# Patient Record
Sex: FEMALE | Race: WHITE | Employment: OTHER | URBAN - METROPOLITAN AREA
[De-identification: names, ages, dates, MRNs, and addresses within clinical notes are randomized per-mention and may not be internally consistent; named-entity substitution may affect disease eponyms.]

---

## 2017-01-24 ENCOUNTER — TELEPHONE (OUTPATIENT)
Dept: FAMILY MEDICINE CLINIC | Age: 72
End: 2017-01-24

## 2017-01-24 NOTE — TELEPHONE ENCOUNTER
Pt called stating she spoke with [facility] and they say they have not yet received the referral to Dr. Yessica Ambriz.  Please send to [facility] for approval. Pt states she needs to be seen ASAP

## 2017-02-14 ENCOUNTER — PATIENT OUTREACH (OUTPATIENT)
Dept: FAMILY MEDICINE CLINIC | Age: 72
End: 2017-02-14

## 2017-02-14 NOTE — PROGRESS NOTES
[address] [facility] Follow Up for [facility] Admission from 2/5 - 2/12/17 for post op nausea and vomiting. RRAT score: 26. High    Medical History:     Past Medical History   Diagnosis Date    Diabetes ([address]     Hypercholesterolemia     Hypertension        This represents Transitions of Care b/c NN spoke with patient and/or caregiver within 2 business days of discharge. Mrs. Lawrence declined to schedule a PCP follow up appointment but states she will see her surgeon on Thursday, 2/16/2017. She agreed to call [facility] if she experiences any untoward symptoms, especially if not directly related to her recent hernia surgery. Course of current Hospitalization (referenced by [address] note dated 2/7/2017):   ----------------------------------------------------------------------  \"RE-ADMISSION NOTE     ASSESSMENT/PLAN:  Persistent postoperative nausea and vomiting, either from postoperative ileus and/or small bowel obstruction.  The patient will be admitted for intravenous hydration, bowel rest.       Note that the patient's CT scan does show UPJ obstruction, which was not evident.  Previously the patient's family were in no awareness of this.  Also note that the Pharmacy has contacted me regarding her dose of Neurontin, which apparently is too high for calculated creatinine clearance.  We will reduce the dose.     CHIEF COMPLAINT:  Admitted with persistent nausea and vomiting postoperatively.     HISTORY OF PRESENT ILLNESS:  The patient is a 65-year-old female, who on February 2, 2017 underwent a laparoscopic repair of a ventral hernia.  She tolerated the procedure well and she was discharged shortly thereafter.  Unfortunately, yesterday, she had persistent nausea and vomiting despite the use of Zofran was unrelenting.  She was treated in the emergency room, where CT scan showed no untoward abnormalities, but the patient could not be treated and managed in the ER and was therefore admitted. \"  ----------------------------------------------------------------------   Medication Reconciliation completed: yes. No new medications at discharge.    Prescription Medication total: 5. (pharmacy consult for polypharm needed?) no     Mrs. Lawrence states she is feeling much better and is glad to be at home. She is tolerating food, moving her bowels and getting around the house without difficulty. She is not taking any pain medication and feels she does not need it. Mrs. Lawrence states that her incision is clean and dry- no swelling, oozing, discoloration, or bleeding.

## 2017-02-15 ENCOUNTER — TELEPHONE (OUTPATIENT)
Dept: FAMILY MEDICINE CLINIC | Age: 72
End: 2017-02-15

## 2017-03-03 ENCOUNTER — PATIENT OUTREACH (OUTPATIENT)
Dept: FAMILY MEDICINE CLINIC | Age: 72
End: 2017-03-03

## 2017-03-03 NOTE — PROGRESS NOTES
Chart review for patient discharge from [facility] on 2/12/2017. No ED or hospitalizations noted since hospital discharge.

## 2017-03-15 ENCOUNTER — PATIENT OUTREACH (OUTPATIENT)
Dept: FAMILY MEDICINE CLINIC | Age: 72
End: 2017-03-15

## 2017-03-15 NOTE — PROGRESS NOTES
Patient has completed 30 days since [facility] discharge on 2/12/2017 for c/o nausea and vomiting post surgery. Mrs. Lawrence had declined a PCP follow up visit. She has not returned to the ED or hospital since discharge. Spoke briefly with Mr. Lolis Barnard this morning via telephone (permissions in EMR). Mr. Lolis Barnard states his wife has gone back to work- she works one day a week and today is the day. He states she is much better and life is getting back to normal.    I advised she call the office if there are any questions or concerns. This episode is closed.

## 2017-05-11 ENCOUNTER — OFFICE VISIT (OUTPATIENT)
Dept: FAMILY MEDICINE CLINIC | Age: 72
End: 2017-05-11

## 2017-05-11 VITALS
WEIGHT: 153 LBS | BODY MASS INDEX: 26.12 KG/M2 | RESPIRATION RATE: 24 BRPM | TEMPERATURE: 98.1 F | OXYGEN SATURATION: 98 % | DIASTOLIC BLOOD PRESSURE: 64 MMHG | HEIGHT: 64 IN | HEART RATE: 69 BPM | SYSTOLIC BLOOD PRESSURE: 118 MMHG

## 2017-05-11 DIAGNOSIS — J06.9 UPPER RESPIRATORY TRACT INFECTION, UNSPECIFIED TYPE: Primary | ICD-10-CM

## 2017-05-11 RX ORDER — AMOXICILLIN AND CLAVULANATE POTASSIUM 500; 125 MG/1; MG/1
1 TABLET, FILM COATED ORAL 2 TIMES DAILY
Qty: 20 TAB | Refills: 0 | Status: SHIPPED | OUTPATIENT
Start: 2017-05-11 | End: 2017-05-21

## 2017-05-11 NOTE — PROGRESS NOTES
[place] Dave is a 66 y.o. female  Chief Complaint   Patient presents with    Nasal Congestion     x 3 days     1. Have you been to the ER, urgent care clinic since your last visit? Hospitalized since your last visit? No    2. Have you seen or consulted any other health care providers outside of the [address] [place] [address] since your last visit? Include any pap smears or colon screening.  No

## 2017-05-11 NOTE — MR AVS SNAPSHOT
Visit Information Date & Time Provider Department Dept. Phone Encounter #  
 5/11/2017  2:30 PM Ashlie Meza, [address] [phone] Upcoming Health Maintenance Date Due Pneumococcal 65+ Low/Medium Risk (1 of 2 - PCV13) 6/1/2009 FOBT Q 1 YEAR, 18+ 9/24/2016 HEMOGLOBIN A1C Q6M 2/11/2017 INFLUENZA AGE 9 TO ADULT 8/1/2017 MICROALBUMIN Q1 8/11/2017 LIPID PANEL Q1 8/11/2017 EYE EXAM RETINAL OR DILATED Q1 8/13/2017 FOOT EXAM Q1 11/28/2017 MEDICARE YEARLY EXAM 11/29/2017 GLAUCOMA SCREENING Q2Y 8/13/2018 BREAST CANCER SCRN MAMMOGRAM 1/10/2019 DTaP/Tdap/Td series (2 - Td) 6/1/2026 Allergies as of 5/11/2017  Review Complete On: 11/28/2016 By: Karly Tran MD  
  
 Severity Noted Reaction Type Reactions Ace Inhibitors  12/11/2012    Cough Atenolol  08/25/2014    Cough Current Immunizations  Never Reviewed No immunizations on file. Not reviewed this visit You Were Diagnosed With   
  
 Codes Comments Upper respiratory tract infection, unspecified type    -  Primary ICD-10-CM: J06.9 ICD-9-CM: 465.9 Vitals BP Pulse Temp Resp Height(growth percentile) Weight(growth percentile)  
 118/64 (BP 1 Location: Left arm, BP Patient Position: Sitting) 69 98.1 °F (36.7 °C) (Oral) 24 5' 4\" (1.626 m) 153 lb (69.4 kg) SpO2 BMI OB Status Smoking Status 98% 26.26 kg/m2 Postmenopausal Former Smoker BMI and BSA Data Body Mass Index Body Surface Area  
 26.26 kg/m 2 1.77 m 2 Preferred Pharmacy Pharmacy Name Phone [facility] [address], [address] [place] [address] [phone] Your Updated Medication List  
  
   
This list is accurate as of: 5/11/17  3:21 PM.  Always use your most recent med list.  
  
  
  
  
 amoxicillin-clavulanate 500-125 mg per tablet Commonly known as:  AUGMENTIN  
 Take 1 Tab by mouth two (2) times a day for 10 days. aspirin delayed-release 81 mg tablet Take  by mouth daily. BIOTIN PO Take  by mouth. cholecalciferol (vitamin D3) 2,000 unit Tab Take  by mouth daily. cranberry extract 450 mg Tab Take  by mouth daily. gabapentin 300 mg capsule Commonly known as:  NEURONTIN Take 2 Caps by mouth three (3) times daily. hydroCHLOROthiazide 25 mg tablet Commonly known as:  HYDRODIURIL  
TAKE ONE TABLET BY MOUTH ONCE DAILY losartan 100 mg tablet Commonly known as:  COZAAR  
TAKE ONE TABLET BY MOUTH ONCE DAILY  
  
 naproxen 500 mg tablet Commonly known as:  NAPROSYN Take 1 tablet by mouth two (2) times daily as needed for Pain.  
  
 simvastatin 20 mg tablet Commonly known as:  ZOCOR  
TAKE ONE TABLET BY MOUTH NIGHTLY Prescriptions Sent to Pharmacy Refills  
 amoxicillin-clavulanate (AUGMENTIN) 500-125 mg per tablet 0 Sig: Take 1 Tab by mouth two (2) times a day for 10 days. Class: Normal  
 Pharmacy: [facility] [address] #: [phone] Route: Oral  
  
Patient Instructions Upper Respiratory Infection (Cold): Care Instructions Your Care Instructions An upper respiratory infection, or URI, is an infection of the nose, sinuses, or throat. URIs are spread by coughs, sneezes, and direct contact. The common cold is the most frequent kind of URI. The flu and sinus infections are other kinds of URIs. Almost all URIs are caused by viruses. Antibiotics won't cure them. But you can treat most infections with home care. This may include drinking lots of fluids and taking over-the-counter pain medicine. You will probably feel better in 4 to 10 days. The doctor has checked you carefully, but problems can develop later. If you notice any problems or new symptoms, get medical treatment right away. Follow-up care is a key part of your treatment and safety. Be sure to make and go to all appointments, and call your doctor if you are having problems. It's also a good idea to know your test results and keep a list of the medicines you take. How can you care for yourself at home? · To prevent dehydration, drink plenty of fluids, enough so that your urine is light yellow or clear like water. Choose water and other caffeine-free clear liquids until you feel better. If you have kidney, heart, or liver disease and have to limit fluids, talk with your doctor before you increase the amount of fluids you drink. · Take an over-the-counter pain medicine, such as acetaminophen (Tylenol), ibuprofen (Advil, Motrin), or naproxen (Aleve). Read and follow all instructions on the label. · Before you use cough and cold medicines, check the label. These medicines may not be safe for young children or for people with certain health problems. · Be careful when taking over-the-counter cold or flu medicines and Tylenol at the same time. Many of these medicines have acetaminophen, which is Tylenol. Read the labels to make sure that you are not taking more than the recommended dose. Too much acetaminophen (Tylenol) can be harmful. · Get plenty of rest. 
· Do not smoke or allow others to smoke around you. If you need help quitting, talk to your doctor about stop-smoking programs and medicines. These can increase your chances of quitting for good. When should you call for help? Call 911 anytime you think you may need emergency care. For example, call if: 
· You have severe trouble breathing. Call your doctor now or seek immediate medical care if: 
· You seem to be getting much sicker. · You have new or worse trouble breathing. · You have a new or higher fever. · You have a new rash. Watch closely for changes in your health, and be sure to contact your doctor if: · You have a new symptom, such as a sore throat, an earache, or sinus pain. · You cough more deeply or more often, especially if you notice more mucus or a change in the color of your mucus. · You do not get better as expected. Where can you learn more? Go to http://tierra-nguyen.info/. Enter O283 in the search box to learn more about \"Upper Respiratory Infection (Cold): Care Instructions. \" Current as of: June 30, 2016 Content Version: 11.2 © 0354-6184 [employer]. Care instructions adapted under license by [employer] (which disclaims liability or warranty for this information). If you have questions about a medical condition or this instruction, always ask your healthcare professional. [address] any warranty or liability for your use of this information. Introducing [facility]! Dear Chato Francisco: 
Thank you for requesting a [employer] account. Our records indicate that you already have an active [employer] account. You can access your account anytime at [email or web address] Did you know that you can access your hospital and ER discharge instructions at any time in [employer]? You can also review all of your test results from your hospital stay or ER visit. Additional Information If you have questions, please visit the Frequently Asked Questions section of the [employer] website at [email or web address] Remember, [employer] is NOT to be used for urgent needs. For medical emergencies, dial 911. Now available from your iPhone and Android! Please provide this summary of care documentation to your next provider. Your primary care clinician is listed as Rusty Peralta. If you have any questions after today's visit, please call [phone].

## 2017-05-11 NOTE — PATIENT INSTRUCTIONS

## 2017-05-11 NOTE — PROGRESS NOTES
Assessment/Plan:    Fela Paniagua was seen today for nasal congestion. Diagnoses and all orders for this visit:    Upper respiratory tract infection, unspecified type  -     amoxicillin-clavulanate (AUGMENTIN) 500-125 mg per tablet; Take 1 Tab by mouth two (2) times a day for 10 days. Will cont Mucinex. Will call if she is not improving. The plan was discussed with the patient. The patient verbalized understanding and is in agreement with the plan. All medication potential side effects were discussed with the patient.    -------------------------------------------------------------------------------------------------------------------        Luh Husain is a 66 y.o. female and presents with Nasal Congestion (x 3 days)         Subjective:  Pt here for 3 days of sinus pressure, congestion, a lot of nasal drainage, + cough, + tired, low in energy. Has started taking some Mucinex but just recently. ROS:  Constitutional: No recent weight change. No weakness/fatigue. No f/c. Skin: No rashes, change in nails/hair, itching   HENT: No HA, dizziness. No hearing loss/tinnitus. No nasal congestion/discharge. Eyes: No change in vision, double/blurred vision or eye pain/redness. Cardiovascular: No CP/palpitations. No ESQUEDA/orthopnea/PND. Respiratory: No cough/sputum, dyspnea, wheezing. Gastointestinal: No dysphagia, reflux. No n/v. No constipation/diarrhea. No melena/rectal bleeding. Genitourinary: No dysuria, urinary hesitancy, nocturia, hematuria. No incontinence. Musculoskeletal: No joint pain/stiffness. No muscle pain/tenderness. Endo: No heat/cold intolerance, no polyuria/polydypsia. Heme: No h/o anemia. No easy bleeding/bruising. Allergy/Immunology: No seasonal rhinitis. Denies frequent colds, sinus/ear infections. Neurological: No seizures/numbness/weakness. No paresthesias. Psychiatric:  No depression, anxiety.      The problem list was updated as a part of today's visit. Patient Active Problem List   Diagnosis Code    HTN (hypertension) I10    HLD (hyperlipidemia) E78.5    DMII (diabetes mellitus, type 2) ([facility] [address] E11.9    Vitamin D deficiency E55.9    Osteoarthritis of neck M47.812    Abnormal uterine bleeding N93.9    CKD (chronic kidney disease) stage 3, GFR 30-59 ml/min N18.3    Advance care planning Z71.89    Neural foraminal stenosis of lumbar spine B69.42    Umbilical hernia C70.9       The PSH, FH were reviewed. SH:  Social History   Substance Use Topics    Smoking status: Former Smoker    Smokeless tobacco: Never Used    Alcohol use No       Medications/Allergies:  Current Outpatient Prescriptions on File Prior to Visit   Medication Sig Dispense Refill    simvastatin (ZOCOR) 20 mg tablet TAKE ONE TABLET BY MOUTH NIGHTLY 90 Tab 0    hydroCHLOROthiazide (HYDRODIURIL) 25 mg tablet TAKE ONE TABLET BY MOUTH ONCE DAILY 90 Tab 1    losartan (COZAAR) 100 mg tablet TAKE ONE TABLET BY MOUTH ONCE DAILY 90 Tab 1    BIOTIN PO Take  by mouth.  cranberry extract (AZO CRANBERRY) 450 mg tab Take  by mouth daily.  aspirin delayed-release 81 mg tablet Take  by mouth daily.  cholecalciferol, vitamin D3, 2,000 unit Tab Take  by mouth daily.  gabapentin (NEURONTIN) 300 mg capsule Take 2 Caps by mouth three (3) times daily. 540 Cap 1    naproxen (NAPROSYN) 500 mg tablet Take 1 tablet by mouth two (2) times daily as needed for Pain. 60 tablet 5     No current facility-administered medications on file prior to visit.          Allergies   Allergen Reactions    Ace Inhibitors Cough    Atenolol Cough         Health Maintenance:   Health Maintenance   Topic Date Due    Pneumococcal 65+ Low/Medium Risk (1 of 2 - PCV13) 06/01/2009    FOBT Q 1 YEAR, 18+  09/24/2016    HEMOGLOBIN A1C Q6M  02/11/2017    INFLUENZA AGE 9 TO ADULT  08/01/2017    MICROALBUMIN Q1  08/11/2017    LIPID PANEL Q1  08/11/2017    EYE EXAM RETINAL OR DILATED Q1  08/13/2017    FOOT EXAM Q1  11/28/2017    MEDICARE YEARLY EXAM  11/29/2017    GLAUCOMA SCREENING Q2Y  08/13/2018    BREAST CANCER SCRN MAMMOGRAM  01/10/2019    DTaP/Tdap/Td series (2 - Td) 06/01/2026    OSTEOPOROSIS SCREENING (DEXA)  Completed    ZOSTER VACCINE AGE 60>  Completed       Objective:  Visit Vitals    /64 (BP 1 Location: Left arm, BP Patient Position: Sitting)    Pulse 69    Temp 98.1 °F (36.7 °C) (Oral)    Resp 24    Ht 5' 4\" (1.626 m)    Wt 153 lb (69.4 kg)    SpO2 98%    BMI 26.26 kg/m2          Nurses notes and VS reviewed. Physical Examination: General appearance - alert, well appearing, and in no distress  Mouth - erythematous  Neck - supple, no significant adenopathy  Chest - rhonchi noted mild and scattered. Heart - normal rate and regular rhythm        Labwork and Ancillary Studies:    CBC w/Diff  Lab Results   Component Value Date/Time    WBC 5.9 08/11/2016 09:58 AM    HGB 12.1 08/11/2016 09:58 AM    PLATELET 014 59/18/5229 09:58 AM         Basic Metabolic Profile  Lab Results   Component Value Date/Time    Sodium 144 08/11/2016 09:58 AM    Potassium 5.0 08/11/2016 09:58 AM    Chloride 104 08/11/2016 09:58 AM    CO2 27 08/11/2016 09:58 AM    Glucose 141 08/11/2016 09:58 AM    BUN 25 08/11/2016 09:58 AM    Creatinine 1.08 08/11/2016 09:58 AM    BUN/Creatinine ratio 23 08/11/2016 09:58 AM    GFR est AA 59 08/11/2016 09:58 AM    GFR est non-AA 51 08/11/2016 09:58 AM    Calcium 10.0 08/11/2016 09:58 AM         LFT  Lab Results   Component Value Date/Time    ALT (SGPT) 14 08/11/2016 09:58 AM    AST (SGOT) 14 08/11/2016 09:58 AM    Alk.  phosphatase 79 08/11/2016 09:58 AM    Bilirubin, total 0.5 08/11/2016 09:58 AM         Cholesterol  Lab Results   Component Value Date/Time    Cholesterol, total 148 08/11/2016 09:58 AM    HDL Cholesterol 47 08/11/2016 09:58 AM    LDL, calculated 80 08/11/2016 09:58 AM    Triglyceride 105 08/11/2016 09:58 AM

## 2017-05-22 ENCOUNTER — HOSPITAL ENCOUNTER (OUTPATIENT)
Dept: LAB | Age: 72
Discharge: HOME | End: 2017-05-22

## 2017-05-22 PROCEDURE — 99001 SPECIMEN HANDLING PT-LAB: CPT | Performed by: INTERNAL MEDICINE

## 2017-05-23 ENCOUNTER — TELEPHONE (OUTPATIENT)
Dept: FAMILY MEDICINE CLINIC | Age: 72
End: 2017-05-23

## 2017-05-23 NOTE — TELEPHONE ENCOUNTER
Pt called and stated that Dr Jolanta Resendez called her and wanted her to come in to go over lab results. Pt stated that she willl be going out of town early Friday morning but Dr Jolanta Resendez next availability wont be until Friday. She asked for the nurse to call her to see if she could get fit in between tomorrow and Thursday.

## 2017-05-25 ENCOUNTER — HOSPITAL ENCOUNTER (OUTPATIENT)
Dept: LAB | Age: 72
Discharge: HOME | End: 2017-05-25

## 2017-05-25 ENCOUNTER — TELEPHONE (OUTPATIENT)
Dept: FAMILY MEDICINE CLINIC | Age: 72
End: 2017-05-25

## 2017-05-25 ENCOUNTER — OFFICE VISIT (OUTPATIENT)
Dept: FAMILY MEDICINE CLINIC | Age: 72
End: 2017-05-25

## 2017-05-25 VITALS
BODY MASS INDEX: 26.46 KG/M2 | RESPIRATION RATE: 17 BRPM | SYSTOLIC BLOOD PRESSURE: 140 MMHG | HEART RATE: 66 BPM | HEIGHT: 64 IN | WEIGHT: 155 LBS | DIASTOLIC BLOOD PRESSURE: 64 MMHG | OXYGEN SATURATION: 98 % | TEMPERATURE: 98.1 F

## 2017-05-25 DIAGNOSIS — R53.83 FATIGUE, UNSPECIFIED TYPE: ICD-10-CM

## 2017-05-25 DIAGNOSIS — E87.5 HYPERKALEMIA: ICD-10-CM

## 2017-05-25 DIAGNOSIS — E78.00 PURE HYPERCHOLESTEROLEMIA: ICD-10-CM

## 2017-05-25 DIAGNOSIS — E11.22 TYPE 2 DIABETES MELLITUS WITH STAGE 3 CHRONIC KIDNEY DISEASE, WITHOUT LONG-TERM CURRENT USE OF INSULIN (HCC): Primary | ICD-10-CM

## 2017-05-25 DIAGNOSIS — N18.30 TYPE 2 DIABETES MELLITUS WITH STAGE 3 CHRONIC KIDNEY DISEASE, WITHOUT LONG-TERM CURRENT USE OF INSULIN (HCC): ICD-10-CM

## 2017-05-25 DIAGNOSIS — N18.30 TYPE 2 DIABETES MELLITUS WITH STAGE 3 CHRONIC KIDNEY DISEASE, WITHOUT LONG-TERM CURRENT USE OF INSULIN (HCC): Primary | ICD-10-CM

## 2017-05-25 DIAGNOSIS — E11.22 TYPE 2 DIABETES MELLITUS WITH STAGE 3 CHRONIC KIDNEY DISEASE, WITHOUT LONG-TERM CURRENT USE OF INSULIN (HCC): ICD-10-CM

## 2017-05-25 PROCEDURE — 99001 SPECIMEN HANDLING PT-LAB: CPT | Performed by: INTERNAL MEDICINE

## 2017-05-25 RX ORDER — INSULIN PUMP SYRINGE, 3 ML
EACH MISCELLANEOUS
Qty: 1 KIT | Refills: 0 | Status: SHIPPED | OUTPATIENT
Start: 2017-05-25 | End: 2017-05-25 | Stop reason: SDUPTHER

## 2017-05-25 RX ORDER — INSULIN PUMP SYRINGE, 3 ML
EACH MISCELLANEOUS
Qty: 1 KIT | Refills: 0 | Status: SHIPPED | OUTPATIENT
Start: 2017-05-25 | End: 2018-03-20 | Stop reason: SDUPTHER

## 2017-05-25 RX ORDER — SIMVASTATIN 20 MG/1
TABLET, FILM COATED ORAL
Qty: 90 TAB | Refills: 3 | Status: SHIPPED | OUTPATIENT
Start: 2017-05-25 | End: 2018-05-30 | Stop reason: SDUPTHER

## 2017-05-25 RX ORDER — LANCETS
EACH MISCELLANEOUS
Qty: 100 EACH | Refills: 11 | Status: SHIPPED | OUTPATIENT
Start: 2017-05-25 | End: 2021-02-01 | Stop reason: SDUPTHER

## 2017-05-25 NOTE — PROGRESS NOTES
1. Have you been to the ER, urgent care clinic since your last visit? Hospitalized since your last visit? Yes, Feb 2017 hernia surgery 2/2, readmitted 2/4-2/12/17      2. Have you seen or consulted any other health care providers outside of the [address] [place] [address] since your last visit? Include any pap smears or colon screening.    Yes, Dr. Chela Min hernia surgery and readmission to [facility]

## 2017-05-25 NOTE — TELEPHONE ENCOUNTER
[facility] called stating they received a prescription request for this pt for diabetic testing supplies however they cannot process the prescriptions. They state the prescription needs to have a brand name as well as diagnosis information. Please re submit new prescription when possible.

## 2017-05-25 NOTE — PROGRESS NOTES
Assessment/Plan:    1. Type 2 diabetes mellitus with stage 3 chronic kidney disease, without long-term current use of insulin ([facility])  -A1c 7.2.  monitor bs. F/u in 3-6 mos. - METABOLIC PANEL, BASIC; Future  - MICROALBUMIN, UR, RAND W/ MICROALBUMIN/CREA RATIO; Future  - REFERRAL TO OPHTHALMOLOGY  - Blood-Glucose Meter monitoring kit; Test bs daily. E11.22  Dispense: 1 Kit; Refill: 0  - Lancets misc; Test bs daily. E11.22  Dispense: 100 Each; Refill: 11  - glucose blood VI test strips (BLOOD GLUCOSE TEST) strip; E11.22. Test bs daily  Dispense: 100 Strip; Refill: 11    2. Fatigue, unspecified type  - OCCULT BLOOD, STOOL; Future    3. Hyperkalemia  -dayne. - METABOLIC PANEL, BASIC; Future    4. Pure hypercholesterolemia  -refilled  - simvastatin (ZOCOR) 20 mg tablet; TAKE ONE TABLET BY MOUTH NIGHTLY  Dispense: 90 Tab; Refill: 3    The plan was discussed with the patient. The patient verbalized understanding and is in agreement with the plan. All medication potential side effects were discussed with the patient. Health Maintenance:   Health Maintenance   Topic Date Due    Pneumococcal 65+ Low/Medium Risk (1 of 2 - PCV13) 06/01/2009    FOBT Q 1 YEAR, 18+  09/24/2016    INFLUENZA AGE 9 TO ADULT  08/01/2017    MICROALBUMIN Q1  08/11/2017    EYE EXAM RETINAL OR DILATED Q1  08/13/2017    HEMOGLOBIN A1C Q6M  11/22/2017    FOOT EXAM Q1  11/28/2017    MEDICARE YEARLY EXAM  11/29/2017    LIPID PANEL Q1  05/22/2018    GLAUCOMA SCREENING Q2Y  08/13/2018    BREAST CANCER SCRN MAMMOGRAM  01/10/2019    DTaP/Tdap/Td series (2 - Td) 06/01/2026    OSTEOPOROSIS SCREENING (DEXA)  Completed    ZOSTER VACCINE AGE 60>  Completed       Vernida Kita is a 66 y.o. female and presents with Diabetes and Follow Up Chronic Condition     Subjective:  DM2 - slightly higher A1c. Has lost wt. HLD - on statin. LDL good. ROS:  Constitutional: No recent weight change. No weakness/fatigue. No f/c.    Cardiovascular: No CP/palpitations. No ESQUEDA/orthopnea/PND. Respiratory: No cough/sputum, dyspnea, wheezing. Gastointestinal: No dysphagia, reflux. No n/v. No constipation/diarrhea. No melena/rectal bleeding. Genitourinary: No dysuria, urinary hesitancy, nocturia, hematuria. No incontinence. Neurological: No seizures/numbness/weakness. No paresthesias. The problem list was updated as a part of today's visit. Patient Active Problem List   Diagnosis Code    HTN (hypertension) I10    HLD (hyperlipidemia) E78.5    DMII (diabetes mellitus, type 2) ([facility] [address] E11.9    Vitamin D deficiency E55.9    Osteoarthritis of neck M47.812    Abnormal uterine bleeding N93.9    CKD (chronic kidney disease) stage 3, GFR 30-59 ml/min N18.3    Advance care planning Z71.89    Neural foraminal stenosis of lumbar spine W40.50    Umbilical hernia F76.7       The PSH, FH were reviewed. SH:  Social History   Substance Use Topics    Smoking status: Former Smoker    Smokeless tobacco: Never Used    Alcohol use No       Medications/Allergies:  Current Outpatient Prescriptions on File Prior to Visit   Medication Sig Dispense Refill    simvastatin (ZOCOR) 20 mg tablet TAKE ONE TABLET BY MOUTH NIGHTLY 90 Tab 0    hydroCHLOROthiazide (HYDRODIURIL) 25 mg tablet TAKE ONE TABLET BY MOUTH ONCE DAILY 90 Tab 1    losartan (COZAAR) 100 mg tablet TAKE ONE TABLET BY MOUTH ONCE DAILY 90 Tab 1    BIOTIN PO Take  by mouth.  naproxen (NAPROSYN) 500 mg tablet Take 1 tablet by mouth two (2) times daily as needed for Pain. 60 tablet 5    aspirin delayed-release 81 mg tablet Take  by mouth daily.  cholecalciferol, vitamin D3, 2,000 unit Tab Take  by mouth daily. No current facility-administered medications on file prior to visit.          Allergies   Allergen Reactions    Fentanyl Shortness of Breath    Ace Inhibitors Cough    Atenolol Cough    Lisinopril Cough     Objective:  Visit Vitals    /64 (BP 1 Location: Right arm, BP Patient Position: Sitting)    Pulse 66    Temp 98.1 °F (36.7 °C) (Oral)    Resp 17    Ht 5' 4\" (1.626 m)    Wt 155 lb (70.3 kg)    SpO2 98%    BMI 26.61 kg/m2      Constitutional: Well developed, nourished, no distress, alert,   CV: S1, S2.  RRR. No murmurs/rubs. No thrills palpated. No carotid bruits. Intact distal pulses. No edema. Pulm: No abnormalities on inspection. Clear to auscultation bilaterally. No wheezing/rhonchi. Normal effort. GI: Soft, nontender, nondistended. Normal active bowel sounds. Neuro: A/O x 3. No focal motor or sensory deficits. Speech normal.   Psych: Appropriate affect, judgement and insight. Short-term memory intact. Labwork and Ancillary Studies:    CBC w/Diff  Lab Results   Component Value Date/Time    WBC 5.9 08/11/2016 09:58 AM    HGB 12.1 08/11/2016 09:58 AM    PLATELET 095 01/30/7329 09:58 AM         Basic Metabolic Profile/LFTs  Lab Results   Component Value Date/Time    Sodium 139 05/22/2017 08:47 AM    Potassium 5.4 05/22/2017 08:47 AM    Chloride 98 05/22/2017 08:47 AM    CO2 25 05/22/2017 08:47 AM    Glucose 140 05/22/2017 08:47 AM    BUN 28 05/22/2017 08:47 AM    Creatinine 1.24 05/22/2017 08:47 AM    BUN/Creatinine ratio 23 05/22/2017 08:47 AM    GFR est AA 50 05/22/2017 08:47 AM    GFR est non-AA 43 05/22/2017 08:47 AM    Calcium 9.7 05/22/2017 08:47 AM      Lab Results   Component Value Date/Time    ALT (SGPT) 16 05/22/2017 08:47 AM    AST (SGOT) 14 05/22/2017 08:47 AM    Alk.  phosphatase 81 05/22/2017 08:47 AM    Bilirubin, total 0.5 05/22/2017 08:47 AM       Cholesterol  Lab Results   Component Value Date/Time    Cholesterol, total 161 05/22/2017 08:47 AM    HDL Cholesterol 44 05/22/2017 08:47 AM    LDL,Direct 94 05/22/2017 08:47 AM    LDL, calculated 84 05/22/2017 08:47 AM    Triglyceride 163 05/22/2017 08:47 AM

## 2017-05-25 NOTE — MR AVS SNAPSHOT
Visit Information Date & Time Provider Department Dept. Phone Encounter #  
 5/25/2017 10:00 AM Homarla Aundrea, 3 [facility] [phone] [id number] Upcoming Health Maintenance Date Due Pneumococcal 65+ Low/Medium Risk (1 of 2 - PCV13) 6/1/2009 FOBT Q 1 YEAR, 18+ 9/24/2016 INFLUENZA AGE 9 TO ADULT 8/1/2017 MICROALBUMIN Q1 8/11/2017 EYE EXAM RETINAL OR DILATED Q1 8/13/2017 HEMOGLOBIN A1C Q6M 11/22/2017 FOOT EXAM Q1 11/28/2017 MEDICARE YEARLY EXAM 11/29/2017 LIPID PANEL Q1 5/22/2018 GLAUCOMA SCREENING Q2Y 8/13/2018 BREAST CANCER SCRN MAMMOGRAM 1/10/2019 DTaP/Tdap/Td series (2 - Td) 6/1/2026 Allergies as of 5/25/2017  Review Complete On: 5/25/2017 By: Annmarie Guillaume MD  
  
 Severity Noted Reaction Type Reactions Fentanyl High 04/14/2016    Shortness of Breath Ace Inhibitors  12/11/2012    Cough Atenolol  08/25/2014    Cough Lisinopril  03/08/2013    Cough Current Immunizations  Never Reviewed No immunizations on file. Not reviewed this visit You Were Diagnosed With   
  
 Codes Comments Type 2 diabetes mellitus with stage 3 chronic kidney disease, without long-term current use of insulin (HCC)    -  Primary ICD-10-CM: E11.22, N18.3 ICD-9-CM: 250.40, 585.3 Fatigue, unspecified type     ICD-10-CM: R53.83 ICD-9-CM: 780.79 Hyperkalemia     ICD-10-CM: E87.5 ICD-9-CM: 276.7 Pure hypercholesterolemia     ICD-10-CM: E78.00 ICD-9-CM: 272.0 Vitals BP Pulse Temp Resp Height(growth percentile) Weight(growth percentile) 140/64 (BP 1 Location: Right arm, BP Patient Position: Sitting) 66 98.1 °F (36.7 °C) (Oral) 17 5' 4\" (1.626 m) 155 lb (70.3 kg) SpO2 BMI OB Status Smoking Status 98% 26.61 kg/m2 Postmenopausal Former Smoker Vitals History BMI and BSA Data Body Mass Index Body Surface Area  
 26.61 kg/m 2 1.78 m 2 Preferred Pharmacy Pharmacy Name Phone [facility] Ehsan Stringer [address], [address] [place] [address] [facility] [phone] Your Updated Medication List  
  
   
This list is accurate as of: 5/25/17 10:25 AM.  Always use your most recent med list.  
  
  
  
  
 aspirin delayed-release 81 mg tablet Take  by mouth daily. BIOTIN PO Take  by mouth. Blood-Glucose Meter monitoring kit Test bs daily. E11.22  
  
 cholecalciferol (vitamin D3) 2,000 unit Tab Take  by mouth daily. glucose blood VI test strips strip Commonly known as:  blood glucose test  
E11.22. Test bs daily  
  
 hydroCHLOROthiazide 25 mg tablet Commonly known as:  HYDRODIURIL  
TAKE ONE TABLET BY MOUTH ONCE DAILY Lancets Misc Test bs daily. E11.22  
  
 losartan 100 mg tablet Commonly known as:  COZAAR  
TAKE ONE TABLET BY MOUTH ONCE DAILY  
  
 naproxen 500 mg tablet Commonly known as:  NAPROSYN Take 1 tablet by mouth two (2) times daily as needed for Pain.  
  
 simvastatin 20 mg tablet Commonly known as:  ZOCOR  
TAKE ONE TABLET BY MOUTH NIGHTLY Prescriptions Sent to Pharmacy Refills  
 simvastatin (ZOCOR) 20 mg tablet 3 Sig: TAKE ONE TABLET BY MOUTH NIGHTLY Class: Normal  
 Pharmacy: [facility] [address] Ph #: [phone] Blood-Glucose Meter monitoring kit 0 Sig: Test bs daily. E11.22 Class: Normal  
 Pharmacy: [facility] [address] Ph #: [phone] Lancets misc 11 Sig: Test bs daily. E11.22 Class: Normal  
 Pharmacy: [facility] [address] Ph #: [phone]  
 glucose blood VI test strips (BLOOD GLUCOSE TEST) strip 11 Sig: E11.22. Test bs daily Class: Normal  
 Pharmacy: [facility] [address] Ph #: [phone] We Performed the Following REFERRAL TO OPHTHALMOLOGY [REF57 Custom] Comments:  
 Please evaluate patient for diabetic eye. [facility] to schedule at [facility] To-Do List   
 05/25/2017 Lab:  METABOLIC PANEL, BASIC   
  
 05/25/2017 Lab:  MICROALBUMIN, UR, RAND W/ MICROALBUMIN/CREA RATIO   
  
 05/25/2017 Lab:  OCCULT BLOOD, STOOL Referral Information Referral ID Referred By Referred To  
  
 [id number] Precious Carey V Not Available Visits Status Start Date End Date 1 New Request 5/25/17 5/25/18 If your referral has a status of pending review or denied, additional information will be sent to support the outcome of this decision. Introducing [facility] & HEALTH SERVICES! Dear Angie Stage: 
Thank you for requesting a [employer] account. Our records indicate that you already have an active [employer] account. You can access your account anytime at [email or web address] Did you know that you can access your hospital and ER discharge instructions at any time in [employer]? You can also review all of your test results from your hospital stay or ER visit. Additional Information If you have questions, please visit the Frequently Asked Questions section of the [employer] website at [email or web address] Remember, [employer] is NOT to be used for urgent needs. For medical emergencies, dial 911. Now available from your iPhone and Android! Please provide this summary of care documentation to your next provider. Your primary care clinician is listed as Rusty Peralta. If you have any questions after today's visit, please call [phone].

## 2017-05-26 LAB
ALBUMIN/CREAT UR: 8.6 MG/G CREAT (ref 0–30)
BUN SERPL-MCNC: 21 MG/DL (ref 8–27)
BUN/CREAT SERPL: 22 (ref 12–28)
CALCIUM SERPL-MCNC: 9.9 MG/DL (ref 8.7–10.3)
CHLORIDE SERPL-SCNC: 100 MMOL/L (ref 96–106)
CO2 SERPL-SCNC: 25 MMOL/L (ref 18–29)
CREAT SERPL-MCNC: 0.96 MG/DL (ref 0.57–1)
CREAT UR-MCNC: 116.3 MG/DL
GLUCOSE SERPL-MCNC: 149 MG/DL (ref 65–99)
INTERPRETATION: NORMAL
Lab: NORMAL
MICROALBUMIN UR-MCNC: 10 UG/ML
POTASSIUM SERPL-SCNC: 4.8 MMOL/L (ref 3.5–5.2)
SODIUM SERPL-SCNC: 142 MMOL/L (ref 134–144)

## 2017-06-15 ENCOUNTER — OFFICE VISIT (OUTPATIENT)
Dept: FAMILY MEDICINE CLINIC | Age: 72
End: 2017-06-15

## 2017-06-15 VITALS
BODY MASS INDEX: 26.46 KG/M2 | TEMPERATURE: 97.3 F | RESPIRATION RATE: 22 BRPM | WEIGHT: 155 LBS | DIASTOLIC BLOOD PRESSURE: 68 MMHG | HEART RATE: 77 BPM | OXYGEN SATURATION: 97 % | HEIGHT: 64 IN | SYSTOLIC BLOOD PRESSURE: 112 MMHG

## 2017-06-15 DIAGNOSIS — H57.11 EYE PAIN, RIGHT: Primary | ICD-10-CM

## 2017-06-15 DIAGNOSIS — N18.30 TYPE 2 DIABETES MELLITUS WITH STAGE 3 CHRONIC KIDNEY DISEASE, WITHOUT LONG-TERM CURRENT USE OF INSULIN (HCC): ICD-10-CM

## 2017-06-15 DIAGNOSIS — H53.149 PHOTOPHOBIA: ICD-10-CM

## 2017-06-15 DIAGNOSIS — E11.22 TYPE 2 DIABETES MELLITUS WITH STAGE 3 CHRONIC KIDNEY DISEASE, WITHOUT LONG-TERM CURRENT USE OF INSULIN (HCC): ICD-10-CM

## 2017-06-15 NOTE — PROGRESS NOTES
Mago Moncada is a 67 y.o. female  1. Have you been to the ER, urgent care clinic since your last visit? Hospitalized since your last visit? No    2. Have you seen or consulted any other health care providers outside of the [address] [place] [address] since your last visit? Include any pap smears or colon screening.  No

## 2017-06-15 NOTE — PROGRESS NOTES
Chief Complaint   Patient presents with    Eye Problem     right eye x1 day     Assessment/Plan  1. Eye pain, right and photophobia in setting of contact lens use- ddx includes corneal abrasion vs foreign body vs other  - REFERRAL TO OPHTHALMOLOGY      The plan was discussed with the patient. The patient verbalized understanding and is in agreement with the plan. All medication potential side effects were discussed with the patient. SUBJECTIVE:   Theoplis Brittle is a 67 y.o. female who complains of 1 day h/o R eye pain. She wears contacts. Feels like a foreign body sensation. +photophobia. Review of Systems - ENT ROS: negative  Respiratory ROS: no cough, shortness of breath, or wheezing  Cardiovascular ROS: no chest pain or dyspnea on exertion  Gastrointestinal ROS: no abdominal pain, change in bowel habits, or black or bloody stools  Musculoskeletal ROS: negative  Neurological ROS: negative    Physical Examination:   Visit Vitals    /68 (BP 1 Location: Left arm, BP Patient Position: Sitting)    Pulse 77    Temp 97.3 °F (36.3 °C) (Oral)    Resp 22    Ht 5' 4\" (1.626 m)    Wt 155 lb (70.3 kg)    SpO2 97%    BMI 26.61 kg/m2       Constitutional: Well developed, nourished, no distress, alert   HENT: Exterior ears and tympanic membranes normal bilaterally. Supple neck. No thyromegaly or lymphadenopathy. Oropharynx clear and moist mucous membranes. Eyes: Conjunctiva normal. PERRL. Mild erythema around iris. Difficulty keeping eye open   CV: S1, S2.  RRR. No murmurs/rubs. No thrills palpated. No carotid bruits. Intact distal pulses. No edema. Pulm: No abnormalities on inspection. Clear to auscultation bilaterally. No wheezing/rhonchi. Normal effort.              Erlinda Mendoza MD

## 2017-06-15 NOTE — MR AVS SNAPSHOT
Visit Information Date & Time Provider Department Dept. Phone Encounter #  
 6/15/2017  2:45 PM Ena Negron, 3 [facility] [phone] [id number] Upcoming Health Maintenance Date Due Pneumococcal 65+ Low/Medium Risk (1 of 2 - PCV13) 6/1/2009 FOBT Q 1 YEAR, 18+ 9/24/2016 INFLUENZA AGE 9 TO ADULT 8/1/2017 EYE EXAM RETINAL OR DILATED Q1 8/13/2017 HEMOGLOBIN A1C Q6M 11/22/2017 FOOT EXAM Q1 11/28/2017 MEDICARE YEARLY EXAM 11/29/2017 LIPID PANEL Q1 5/22/2018 MICROALBUMIN Q1 5/25/2018 GLAUCOMA SCREENING Q2Y 8/13/2018 BREAST CANCER SCRN MAMMOGRAM 1/10/2019 DTaP/Tdap/Td series (2 - Td) 6/1/2026 Allergies as of 6/15/2017  Review Complete On: 5/25/2017 By: Ena Negron MD  
  
 Severity Noted Reaction Type Reactions Fentanyl High 04/14/2016    Shortness of Breath Ace Inhibitors  12/11/2012    Cough Atenolol  08/25/2014    Cough Lisinopril  03/08/2013    Cough Current Immunizations  Never Reviewed No immunizations on file. Not reviewed this visit You Were Diagnosed With   
  
 Codes Comments Eye pain, right    -  Primary ICD-10-CM: H57.11 ICD-9-CM: 379.91 Photophobia     ICD-10-CM: H53.149 ICD-9-CM: 368.13 Vitals BP Pulse Temp Resp Height(growth percentile) Weight(growth percentile) 112/68 (BP 1 Location: Left arm, BP Patient Position: Sitting) 77 97.3 °F (36.3 °C) (Oral) 22 5' 4\" (1.626 m) 155 lb (70.3 kg) SpO2 BMI OB Status Smoking Status 97% 26.61 kg/m2 Postmenopausal Former Smoker Vitals History BMI and BSA Data Body Mass Index Body Surface Area  
 26.61 kg/m 2 1.78 m 2 Preferred Pharmacy Pharmacy Name Phone [facility] [address] [place] [address] [phone] Your Updated Medication List  
  
   
This list is accurate as of: 6/15/17  2:57 PM.  Always use your most recent med list.  
  
  
  
  
 aspirin delayed-release 81 mg tablet Take  by mouth daily. BIOTIN PO Take  by mouth. Blood-Glucose Meter monitoring kit Free Style,Test bs daily. E11.22  
  
 cholecalciferol (vitamin D3) 2,000 unit Tab Take  by mouth daily. * glucose blood VI test strips strip Commonly known as:  blood glucose test  
E11.22. Test bs daily * glucose blood VI test strips strip Commonly known as:  FREESTYLE TEST Dx: E11.22 test bs daily  
  
 hydroCHLOROthiazide 25 mg tablet Commonly known as:  HYDRODIURIL  
TAKE ONE TABLET BY MOUTH ONCE DAILY Lancets Misc Test bs daily. E11.22  
  
 losartan 100 mg tablet Commonly known as:  COZAAR  
TAKE ONE TABLET BY MOUTH ONCE DAILY  
  
 naproxen 500 mg tablet Commonly known as:  NAPROSYN Take 1 tablet by mouth two (2) times daily as needed for Pain.  
  
 simvastatin 20 mg tablet Commonly known as:  ZOCOR  
TAKE ONE TABLET BY MOUTH NIGHTLY * Notice: This list has 2 medication(s) that are the same as other medications prescribed for you. Read the directions carefully, and ask your doctor or other care provider to review them with you. We Performed the Following REFERRAL TO OPHTHALMOLOGY [REF57 Custom] Referral Information Referral ID Referred By Referred To  
  
 [id number] [facility], Kae Livingston MD   
   [address] [place] , [address] [facility] [address] [facility] [address] Phone: [phone] Fax: [phone] Visits Status Start Date End Date 1 New Request 6/15/17 6/15/18 If your referral has a status of pending review or denied, additional information will be sent to support the outcome of this decision. Introducing [facility] & HEALTH SERVICES! Dear Margo Connell: 
Thank you for requesting a [employer] account. Our records indicate that you already have an active [employer] account. You can access your account anytime at [email or web address] Did you know that you can access your hospital and ER discharge instructions at any time in [employer]? You can also review all of your test results from your hospital stay or ER visit. Additional Information If you have questions, please visit the Frequently Asked Questions section of the [employer] website at [email or web address] Remember, [employer] is NOT to be used for urgent needs. For medical emergencies, dial 911. Now available from your iPhone and Android! Please provide this summary of care documentation to your next provider. Your primary care clinician is listed as Rusty Peralta. If you have any questions after today's visit, please call [phone].

## 2018-03-20 ENCOUNTER — OFFICE VISIT (OUTPATIENT)
Dept: FAMILY MEDICINE CLINIC | Age: 73
End: 2018-03-20

## 2018-03-20 VITALS
HEART RATE: 71 BPM | OXYGEN SATURATION: 99 % | TEMPERATURE: 98.1 F | BODY MASS INDEX: 27.14 KG/M2 | WEIGHT: 159 LBS | DIASTOLIC BLOOD PRESSURE: 84 MMHG | SYSTOLIC BLOOD PRESSURE: 132 MMHG | HEIGHT: 64 IN | RESPIRATION RATE: 18 BRPM

## 2018-03-20 DIAGNOSIS — Z66 DNR (DO NOT RESUSCITATE): ICD-10-CM

## 2018-03-20 DIAGNOSIS — Z12.11 SCREEN FOR COLON CANCER: ICD-10-CM

## 2018-03-20 DIAGNOSIS — E11.22 TYPE 2 DIABETES MELLITUS WITH STAGE 3 CHRONIC KIDNEY DISEASE, WITHOUT LONG-TERM CURRENT USE OF INSULIN (HCC): Primary | ICD-10-CM

## 2018-03-20 DIAGNOSIS — I10 ESSENTIAL HYPERTENSION: ICD-10-CM

## 2018-03-20 DIAGNOSIS — E78.00 PURE HYPERCHOLESTEROLEMIA: ICD-10-CM

## 2018-03-20 DIAGNOSIS — Z00.00 MEDICARE ANNUAL WELLNESS VISIT, SUBSEQUENT: ICD-10-CM

## 2018-03-20 DIAGNOSIS — K42.9 UMBILICAL HERNIA WITHOUT OBSTRUCTION AND WITHOUT GANGRENE: ICD-10-CM

## 2018-03-20 DIAGNOSIS — N18.30 TYPE 2 DIABETES MELLITUS WITH STAGE 3 CHRONIC KIDNEY DISEASE, WITHOUT LONG-TERM CURRENT USE OF INSULIN (HCC): Primary | ICD-10-CM

## 2018-03-20 PROBLEM — Z28.21 23-POLYVALENT PNEUMOCOCCAL POLYSACCHARIDE VACCINE DECLINED: Status: ACTIVE | Noted: 2018-03-20

## 2018-03-20 RX ORDER — INSULIN PUMP SYRINGE, 3 ML
EACH MISCELLANEOUS
Qty: 1 KIT | Refills: 0 | Status: SHIPPED | OUTPATIENT
Start: 2018-03-20 | End: 2019-12-17 | Stop reason: SDUPTHER

## 2018-03-20 NOTE — ACP (ADVANCE CARE PLANNING)
Advance Care Planning (ACP) Provider Conversation Snapshot    Date of ACP Conversation: 03/20/18  Persons included in Conversation:  patient  Length of ACP Conversation in minutes:  <16 minutes (Non-Billable)    Authorized Decision Maker (if patient is incapable of making informed decisions):    This person is:   Healthcare Agent/Medical Power of  under Advance Directive  Bill,         For Patients with Decision Making Capacity:   pt is DNR, doesn't wish for prolonged measures past 5 days in event of coma    Conversation Outcomes / Follow-Up Plan:   Recommended completion of Advance Directive form after review of ACP materials and conversation with prospective healthcare agent   Entered DNR order (If yes, complete Durable DNR form)

## 2018-03-20 NOTE — PROGRESS NOTES
This is the Subsequent Medicare Annual Wellness Exam, performed 12 months or more after the Initial AWV or the last Subsequent AWV    I have reviewed the patient's medical history in detail and updated the computerized patient record. History     Past Medical History:   Diagnosis Date    Diabetes ([facility]     Hypercholesterolemia     Hypertension       Past Surgical History:   Procedure Laterality Date    HX COLONOSCOPY  2005    HX GI  02/02/2017    hernia surgery     Current Outpatient Prescriptions   Medication Sig Dispense Refill    hydroCHLOROthiazide (HYDRODIURIL) 25 mg tablet TAKE ONE TABLET BY MOUTH ONCE DAILY 90 Tab 0    losartan (COZAAR) 100 mg tablet TAKE ONE TABLET BY MOUTH ONCE DAILY 90 Tab 0    simvastatin (ZOCOR) 20 mg tablet TAKE ONE TABLET BY MOUTH NIGHTLY 90 Tab 3    Lancets misc Test bs daily. E11.22 100 Each 11    glucose blood VI test strips (BLOOD GLUCOSE TEST) strip E11.22. Test bs daily 100 Strip 11    glucose blood VI test strips (FREESTYLE TEST) strip Dx: E11.22 test bs daily 100 Strip 0    Blood-Glucose Meter monitoring kit Free Style,Test bs daily. E11.22 1 Kit 0    BIOTIN PO Take  by mouth.  naproxen (NAPROSYN) 500 mg tablet Take 1 tablet by mouth two (2) times daily as needed for Pain. 60 tablet 5    aspirin delayed-release 81 mg tablet Take  by mouth daily.  cholecalciferol, vitamin D3, 2,000 unit Tab Take  by mouth daily.        Allergies   Allergen Reactions    Fentanyl Shortness of Breath    Ace Inhibitors Cough    Atenolol Cough    Lisinopril Cough     Family History   Problem Relation Age of Onset    Heart Disease Mother      pacemaker    Stroke Father      Social History   Substance Use Topics    Smoking status: Former Smoker    Smokeless tobacco: Never Used    Alcohol use No     Patient Active Problem List   Diagnosis Code    HTN (hypertension) I10    HLD (hyperlipidemia) E78.5    DMII [facility]diabetes mellitus, type 2) ([facility] E11.9    Vitamin D deficiency E55.9    Osteoarthritis of neck M47.812    Abnormal uterine bleeding N93.9    CKD (chronic kidney disease) stage 3, GFR 30-59 ml/min N18.3    Advance care planning Z71.89    Neural foraminal stenosis of lumbar spine J28.55    Umbilical hernia A17.9       Depression Risk Factor Screening:     PHQ over the last two weeks 3/20/2018   Little interest or pleasure in doing things Not at all   Feeling down, depressed or hopeless Not at all   Total Score PHQ 2 0     Alcohol Risk Factor Screening: You do not drink alcohol or very rarely. Functional Ability and Level of Safety:   Hearing Loss  Hearing is good. Activities of Daily Living  The home contains: no safety equipment. Patient does total self care    Fall Risk  Fall Risk Assessment, last 12 mths 3/20/2018   Able to walk? Yes   Fall in past 12 months? Yes   Fall with injury? No   Number of falls in past 12 months 1   Fall Risk Score 1       Abuse Screen  Patient is not abused    Cognitive Screening   Evaluation of Cognitive Function:  Has your family/caregiver stated any concerns about your memory: no  Normal, Mini Cog test    Patient Care Team   Patient Care Team:  Rolando Grossman MD as PCP - General (Internal Medicine)    Assessment/Plan   Education and counseling provided:  Are appropriate based on today's review and evaluation  End-of-Life planning (with patient's consent)  Colorectal cancer screening tests    Diagnoses and all orders for this visit:    1. Medicare annual wellness visit, subsequent      Health Maintenance Due   Topic Date Due    FOBT Q 1 YEAR, 18+  09/24/2016    HEMOGLOBIN A1C Q6M  11/22/2017       Assessment/Plan:    1. Type 2 diabetes mellitus with stage 3 chronic kidney disease, without long-term current use of insulin ([facility]  -cont lifestyle. F/u in 6mos  - HEMOGLOBIN A1C W/O EAG;  Future  - MICROALBUMIN, UR, RAND W/ MICROALB/CREAT RATIO; Future  - HM DIABETES FOOT EXAM  - glucose blood VI test strips (BLOOD GLUCOSE TEST) strip; E11.22. Test bs daily  Dispense: 100 Strip; Refill: 11  - glucose blood VI test strips (FREESTYLE TEST) strip; Dx: E11.22 test bs daily  Dispense: 100 Strip; Refill: 0  - Blood-Glucose Meter monitoring kit; Free Style,Test bs daily. E11.22  Dispense: 1 Kit; Refill: 0    2. Essential hypertension  -cont current. Better controlled at last visit  - METABOLIC PANEL, COMPREHENSIVE; Future  - LIPID PANEL; Future  - CBC W/O DIFF; Future    3. Pure hypercholesterolemia  - METABOLIC PANEL, COMPREHENSIVE; Future  - LIPID PANEL; Future    4. Medicare annual wellness visit, subsequent  - METABOLIC PANEL, COMPREHENSIVE; Future  - LIPID PANEL; Future  - CBC W/O DIFF; Future    5. Screen for colon cancer  - COLOGUARD TEST (FECAL DNA COLORECTAL CANCER SCREENING)    6. DNR (do not resuscitate)  - DO NOT RESUSCITATE    7. Umbilical hernia without obstruction and without gangrene  - REFERRAL TO SURGERY      The plan was discussed with the patient. The patient verbalized understanding and is in agreement with the plan. All medication potential side effects were discussed with the patient. Health Maintenance:   Health Maintenance   Topic Date Due    FOBT Q 1 YEAR, 18+  09/24/2016    HEMOGLOBIN A1C Q6M  11/22/2017    LIPID PANEL Q1  05/22/2018    MICROALBUMIN Q1  05/25/2018    EYE EXAM RETINAL OR DILATED Q1  10/06/2018    BREAST CANCER SCRN MAMMOGRAM  01/10/2019    Pneumococcal 65+ Low/Medium Risk (2 of 2 - PPSV23) 03/20/2019    FOOT EXAM Q1  03/20/2019    MEDICARE YEARLY EXAM  03/21/2019    GLAUCOMA SCREENING Q2Y  10/06/2019    DTaP/Tdap/Td series (2 - Td) 06/01/2026    Bone Densitometry (Dexa) Screening  Completed    ZOSTER VACCINE AGE 60>  Completed    Influenza Age 5 to Adult  Addressed       Ava Yepez is a 67 y.o. female and presents with Annual Wellness Visit     Subjective:  HTN - bp elevated. Compliant with meds. Doesn't check bp at home. DM2 -  Hasn't had a1c checked since 5/2017. Not on meds. Doesn't check bs. ROS:  Constitutional: No recent weight change. No weakness/fatigue. No f/c. Skin: No rashes, change in nails/hair, itching   HENT: No HA, dizziness. No hearing loss/tinnitus. No nasal congestion/discharge. Eyes: No change in vision, double/blurred vision or eye pain/redness. Cardiovascular: No CP/palpitations. No ESQUEDA/orthopnea/PND. Respiratory: No cough/sputum, dyspnea, wheezing. Gastointestinal: No dysphagia, reflux. No n/v. No constipation/diarrhea. No melena/rectal bleeding. Genitourinary: No dysuria, urinary hesitancy, nocturia, hematuria. No incontinence. Musculoskeletal: No joint pain/stiffness. No muscle pain/tenderness. Endo: No heat/cold intolerance, no polyuria/polydypsia. Heme: No h/o anemia. No easy bleeding/bruising. Allergy/Immunology: No seasonal rhinitis. Denies frequent colds, sinus/ear infections. Neurological: No seizures/numbness/weakness. No paresthesias. Psychiatric:  No depression, anxiety. The problem list was updated as a part of today's visit. Patient Active Problem List   Diagnosis Code    HTN (hypertension) I10    HLD (hyperlipidemia) E78.5    DMII (diabetes mellitus, type 2) ([facility] E11.9    Vitamin D deficiency E55.9    Osteoarthritis of neck M47.812    Abnormal uterine bleeding N93.9    CKD (chronic kidney disease) stage 3, GFR 30-59 ml/min N18.3    Neural foraminal stenosis of lumbar spine I82.98    Umbilical hernia T19.0       The PSH, FH were reviewed. SH:  Social History   Substance Use Topics    Smoking status: Former Smoker    Smokeless tobacco: Never Used    Alcohol use No       Medications/Allergies:  Current Outpatient Prescriptions on File Prior to Visit   Medication Sig Dispense Refill    hydroCHLOROthiazide (HYDRODIURIL) 25 mg tablet TAKE ONE TABLET BY MOUTH ONCE DAILY 90 Tab 0    simvastatin (ZOCOR) 20 mg tablet TAKE ONE TABLET BY MOUTH NIGHTLY 90 Tab 3    Lancets misc Test bs daily. E11.22 100 Each 11    glucose blood VI test strips (BLOOD GLUCOSE TEST) strip E11.22. Test bs daily 100 Strip 11    glucose blood VI test strips (FREESTYLE TEST) strip Dx: E11.22 test bs daily 100 Strip 0    Blood-Glucose Meter monitoring kit Free Style,Test bs daily. E11.22 1 Kit 0    BIOTIN PO Take  by mouth.  aspirin delayed-release 81 mg tablet Take  by mouth daily.  cholecalciferol, vitamin D3, 2,000 unit Tab Take  by mouth daily.  losartan (COZAAR) 100 mg tablet TAKE ONE TABLET BY MOUTH ONCE DAILY 90 Tab 0     No current facility-administered medications on file prior to visit. Allergies   Allergen Reactions    Fentanyl Shortness of Breath    Ace Inhibitors Cough    Atenolol Cough    Lisinopril Cough       Objective:  Visit Vitals    /84    Pulse 71    Temp 98.1 °F (36.7 °C) (Oral)    Resp 18    Ht 5' 4\" (1.626 m)    Wt 159 lb (72.1 kg)    SpO2 99%    BMI 27.29 kg/m2      Constitutional: Well developed, nourished, no distress, alert   HENT: Exterior ears and tympanic membranes normal bilaterally. Supple neck. No thyromegaly or lymphadenopathy. Oropharynx clear and moist mucous membranes. Eyes: Conjunctiva normal. PERRL. CV: S1, S2.  RRR. No murmurs/rubs. No thrills palpated. No carotid bruits. Intact distal pulses. No edema. Pulm: No abnormalities on inspection. Clear to auscultation bilaterally. No wheezing/rhonchi. Normal effort. GI: Soft, nontender, nondistended. Normal active bowel sounds. MS: Gait normal.  Joints without deformity/tenderness. Strength intact bilateral upper and lower ext. Normal ROM all extremities. Neuro: A/O x 3. No focal motor or sensory deficits. Speech normal.   Skin: No lesions/rashes on inspection. Psych: Appropriate affect, judgement and insight. Short-term memory intact.      Monofilament nml bilat

## 2018-03-20 NOTE — MR AVS SNAPSHOT
[address] [place] [address]  [id number] [facility] [id number] [phone] Patient: Jakub Moses MRN: [id number]:[id number] Visit Information Date & Time Provider Department Dept. Phone Encounter #  
 3/20/2018  9:30 AM Seamus Eric, [address] [phone] [id number] Upcoming Health Maintenance Date Due FOBT Q 1 YEAR, 18+ 9/24/2016 HEMOGLOBIN A1C Q6M 11/22/2017 LIPID PANEL Q1 5/22/2018 MICROALBUMIN Q1 5/25/2018 EYE EXAM RETINAL OR DILATED Q1 10/6/2018 BREAST CANCER SCRN MAMMOGRAM 1/10/2019 Pneumococcal 65+ Low/Medium Risk (2 of 2 - PPSV23) 3/20/2019 FOOT EXAM Q1 3/20/2019 MEDICARE YEARLY EXAM 3/21/2019 GLAUCOMA SCREENING Q2Y 10/6/2019 DTaP/Tdap/Td series (2 - Td) 6/1/2026 Allergies as of 3/20/2018  Review Complete On: 3/20/2018 By: Seamus Eric MD  
  
 Severity Noted Reaction Type Reactions Fentanyl High 04/14/2016    Shortness of Breath Ace Inhibitors  12/11/2012    Cough Atenolol  08/25/2014    Cough Lisinopril  03/08/2013    Cough Current Immunizations  Never Reviewed No immunizations on file. Not reviewed this visit You Were Diagnosed With   
  
 Codes Comments Medicare annual wellness visit, subsequent    -  Primary ICD-10-CM: Z00.00 ICD-9-CM: V70.0 Essential hypertension     ICD-10-CM: I10 
ICD-9-CM: 401.9 Pure hypercholesterolemia     ICD-10-CM: E78.00 ICD-9-CM: 272.0 Type 2 diabetes mellitus with stage 3 chronic kidney disease, without long-term current use of insulin (HCC)     ICD-10-CM: E11.22, N18.3 ICD-9-CM: 250.40, 585.3 Screen for colon cancer     ICD-10-CM: Z12.11 ICD-9-CM: V76.51   
 DNR (do not resuscitate)     ICD-10-CM: E97 ICD-9-CM: V49.86 Umbilical hernia without obstruction and without gangrene     ICD-10-CM: K42.9 ICD-9-CM: 553.1 Vitals BP Pulse Temp Resp Height(growth percentile) Weight(growth percentile) 142/78 (BP 1 Location: Right arm, BP Patient Position: Sitting) 71 98.1 °F (36.7 °C) (Oral) 18 5' 4\" (1.626 m) 159 lb (72.1 kg) SpO2 BMI OB Status Smoking Status 99% 27.29 kg/m2 Postmenopausal Former Smoker Vitals History BMI and BSA Data Body Mass Index Body Surface Area  
 27.29 kg/m 2 1.8 m 2 Preferred Pharmacy Pharmacy Name Phone [facility], [address] [place] [address] [address] [phone] Your Updated Medication List  
  
   
This list is accurate as of 3/20/18  9:57 AM.  Always use your most recent med list.  
  
  
  
  
 aspirin delayed-release 81 mg tablet Take  by mouth daily. BIOTIN PO Take  by mouth. Blood-Glucose Meter monitoring kit Free Style,Test bs daily. E11.22  
  
 cholecalciferol (vitamin D3) 2,000 unit Tab Take  by mouth daily. * glucose blood VI test strips strip Commonly known as:  blood glucose test  
E11.22. Test bs daily * glucose blood VI test strips strip Commonly known as:  FREESTYLE TEST Dx: E11.22 test bs daily  
  
 hydroCHLOROthiazide 25 mg tablet Commonly known as:  HYDRODIURIL  
TAKE ONE TABLET BY MOUTH ONCE DAILY Lancets Misc Test bs daily. E11.22  
  
 losartan 100 mg tablet Commonly known as:  COZAAR  
TAKE ONE TABLET BY MOUTH ONCE DAILY  
  
 simvastatin 20 mg tablet Commonly known as:  ZOCOR  
TAKE ONE TABLET BY MOUTH NIGHTLY * Notice: This list has 2 medication(s) that are the same as other medications prescribed for you. Read the directions carefully, and ask your doctor or other care provider to review them with you. Prescriptions Sent to Pharmacy Refills  
 glucose blood VI test strips (BLOOD GLUCOSE TEST) strip 11 Sig: E11.22. Test bs daily  Class: Normal  
 Pharmacy: [address], [address] [facility] Ph #: [phone]  
 glucose blood VI test strips (FREESTYLE TEST) strip 0 Sig: Dx: E11.22 test bs daily Class: Normal  
 [facility]acy: [facility]  [address] [place] [address] Ph #: [phone] Blood-Glucose Meter monitoring kit 0 Sig: Free Style,Test bs daily. E11.22 Class: Normal  
 Pharmacy: [facility]  [address] [place] [address] Ph #: [phone] We Performed the Following COLOGUARD TEST (FECAL DNA COLORECTAL CANCER SCREENING) [86802 CPT(R)] DO NOT RESUSCITATE Bob Mckeonuel  DIABETES FOOT EXAM [HM7 Custom] REFERRAL TO SURGERY [CUT888 Custom] To-Do List   
 03/20/2018 Lab:  CBC W/O DIFF   
  
 03/20/2018 Lab:  HEMOGLOBIN A1C W/O EAG   
  
 03/20/2018 Lab:  LIPID PANEL   
  
 03/20/2018 Lab:  METABOLIC PANEL, COMPREHENSIVE   
  
 03/20/2018 Lab:  MICROALBUMIN, UR, RAND W/ MICROALB/CREAT RATIO Referral Information Referral ID Referred By Referred To  
  
 [id number] [facility], [address] [place] [address]. [address] Phone: [phone] Fax: [phone] Visits Status Start Date End Date 1 New Request 3/20/18 3/20/19 If your referral has a status of pending review or denied, additional information will be sent to support the outcome of this decision. Patient Instructions Medicare Wellness Visit, Female The best way to live healthy is to have a healthy lifestyle by eating a well-balanced diet, exercising regularly, limiting alcohol and stopping smoking. Regular physical exams and screening tests are another way to keep healthy. Preventive exams provided by your health care provider can find health problems before they become diseases or illnesses.  Preventive services including immunizations, screening tests, monitoring and exams can help you take care of your own health. All people over age 72 should have a pneumovax  and and a prevnar shot to prevent pneumonia. These are once in a lifetime unless you and your provider decide differently. All people over 65 should have a yearly flu shot and a tetanus vaccine every 10 years. A bone mass density to screen for osteoporosis or thinning of the bones should be done every 2 years after 65. Screening for diabetes mellitus with a blood sugar test should be done every year. Glaucoma is a disease of the eye due to increased ocular pressure that can lead to blindness and it should be done every year by an eye professional. 
 
Cardiovascular screening tests that check for elevated lipids (fatty part of blood) which can lead to heart disease and strokes should be done every 5 years. Colorectal screening that evaluates for blood or polyps in your colon should be done yearly as a stool test or every five years as a flexible sigmoidoscope or every 10 years as a colonoscopy up to age 76. Breast cancer screening with a mammogram is recommended biennially  for women age 54-69. Screening for cervical cancer with a pap smear and pelvic exam is recommended for women after age 72 years every 2 years up to age 79 or when the provider and patient decide to stop. If there is a history of cervical abnormalities or other increased risk for cancer then the test is recommended yearly. Hepatitis C screening is also recommended for anyone born between 80 through [address]. A shingles vaccine is also recommended once in a lifetime after age 61. Your Medicare Wellness Exam is recommended annually. Here is a list of your current Health Maintenance items with a due date: 
Health Maintenance Due Topic Date Due  Pneumococcal Vaccine (1 of 2 - PCV13) 06/01/2009  Stool testing for trace blood  09/24/2016  Flu Vaccine  08/01/2017  Hemoglobin A1C    11/22/2017 [facility] Diabetic Foot Care  11/28/2017 [facility] Annual Well Visit  03/14/2018 Colon Cancer Screening: Care Instructions Your Care Instructions Colorectal cancer occurs in the colon or rectum. That's the lower part of your digestive system. It is the second-leading cause of cancer deaths in the [place]. It often starts with small growths called polyps in the colon or rectum. Polyps are usually found with screening tests. Depending on the type of test, any polyps found may be removed during the tests. Colorectal cancer usually does not cause symptoms at first. But regular tests can help find it early, before it spreads and becomes harder to treat. Experts advise routine tests for colon cancer for people starting at age 48. And they advise people with a higher risk of colon cancer to get tested sooner. Talk with your doctor about when you should start testing. Discuss which tests you need. Follow-up care is a key part of your treatment and safety. Be sure to make and go to all appointments, and call your doctor if you are having problems. It's also a good idea to know your test results and keep a list of the medicines you take. What are the main screening tests for colon cancer? · Stool tests. These include the fecal immunochemical test (FIT) and the fecal occult blood test (FOBT). These tests check stool samples for signs of cancer. If your test is positive, you will need to have a colonoscopy. · Sigmoidoscopy. This test lets your doctor look at the lining of your rectum and the lowest part of your colon. Your doctor uses a lighted tube called a sigmoidoscope. This test can't find cancers or polyps in the upper part of your colon. In some cases, polyps that are found can be removed. But if your doctor finds polyps, you will need to have a colonoscopy to check the upper part of your colon. · Colonoscopy.  This test lets your doctor look at the lining of your rectum and your entire colon. The doctor uses a thin, flexible tool called a colonoscope. It can also be used to remove polyps or get a tissue sample (biopsy). What tests do you need? The following guidelines are for people age 48 and over who are not at high risk for colorectal cancer. You may have at least one of these tests as directed by your doctor. · Fecal immunochemical test (FIT) or fecal occult blood test (FOBT) every year · Sigmoidoscopy every 5 years · Colonoscopy every 10 years If you are age 68 to 80, you can work with your doctor to decide if screening is a good option. If you are age 80 or older, your doctor will likely advise that screening is not helpful. Talk with your doctor about when you need to be tested. And discuss which tests are right for you. Your doctor may recommend earlier or more frequent testing if you: 
· Have had colorectal cancer before. · Have had colon polyps. · Have symptoms of colorectal cancer. These include blood in your stool and changes in your bowel habits. · Have a parent, brother or sister, or child with colon polyps or colorectal cancer. · Have a bowel disease. This includes ulcerative colitis and Crohn's disease. · Have a rare polyp syndrome that runs in families, such as familial adenomatous polyposis (FAP). · Have had radiation treatments to the belly or pelvis. When should you call for help? Watch closely for changes in your health, and be sure to contact your doctor if: 
? · You have any changes in your bowel habits. ? · You have any problems. Where can you learn more? Go to http://tierra-nguyen.info/. Enter M541 in the search box to learn more about \"Colon Cancer Screening: Care Instructions. \" Current as of: May 12, 2017 Content Version: 11.4 © 5701-9816 [employer].  Care instructions adapted under license by [employer] | Anywhere (which disclaims liability or warranty for this information). If you have questions about a medical condition or this instruction, always ask your healthcare professional. [address] any warranty or liability for your use of this information. Introducing [facility]! Dear Sunny Rutherford: 
Thank you for requesting a [employer] (MyBrandz) account. Our records indicate that you already have an active [employer]ager ([email or web address]) account. You can access your account anytime at [email or web address] Did you know that you can access your hospital and ER discharge instructions at any time in [employer] (MyBrandz)? You can also review all of your test results from your hospital stay or ER visit. Additional Information If you have questions, please visit the Frequently Asked Questions section of the FanGager (MyBrandz) website at [email or web address] Remember, [employer] (My[employer]z) is NOT to be used for urgent needs. For medical emergencies, dial 911. Now available from your iPhone and Android! Please provide this summary of care documentation to your next provider. Your primary care clinician is listed as Rusty Peralta. If you have any questions after today's visit, please call [phone].

## 2018-03-20 NOTE — PROGRESS NOTES
Manuel Ruelas is a 67 y.o. female (: 1945) presenting to address:    Chief Complaint   Patient presents with    Annual Wellness Visit       Vitals:    18 0933   BP: 142/78   Pulse: 71   Resp: 18   Temp: 98.1 °F (36.7 °C)   TempSrc: Oral   SpO2: 99%   Weight: 159 lb (72.1 kg)   Height: 5' 4\" (1.626 m)   PainSc:   0 - No pain       Hearing/Vision:      Visual Acuity Screening    Right eye Left eye Both eyes   Without correction:      With correction: 20/25 20/50 20/25       Learning Assessment:     Learning Assessment 2015   PRIMARY LEARNER Patient   HIGHEST LEVEL OF EDUCATION - PRIMARY LEARNER  SOME COLLEGE   BARRIERS PRIMARY LEARNER NONE   CO-LEARNER CAREGIVER No   PRIMARY LANGUAGE ENGLISH   LEARNER PREFERENCE PRIMARY VIDEOS   ANSWERED BY self   RELATIONSHIP SELF     Depression Screening:     PHQ over the last two weeks 3/20/2018   Little interest or pleasure in doing things Not at all   Feeling down, depressed or hopeless Not at all   Total Score PHQ 2 0     Fall Risk Assessment:     Fall Risk Assessment, last 12 mths 3/20/2018   Able to walk? Yes   Fall in past 12 months? Yes   Fall with injury? No   Number of falls in past 12 months 1   Fall Risk Score 1     Abuse Screening:     Abuse Screening Questionnaire 3/20/2018   Do you ever feel afraid of your partner? N   Are you in a relationship with someone who physically or mentally threatens you? N   Is it safe for you to go home? Y     Coordination of Care Questionaire:   1. Have you been to the ER, urgent care clinic since your last visit? Hospitalized since your last visit? NO    2. Have you seen or consulted any other health care providers outside of the [address] [place] [address] since your last visit? Include any pap smears or colon screening. NO    Advanced Directive:   1. Do you have an Advanced Directive? YES    2. Would you like information on Advanced Directives?  NO

## 2018-03-20 NOTE — PATIENT INSTRUCTIONS
Medicare Wellness Visit, Female    The best way to live healthy is to have a healthy lifestyle by eating a well-balanced diet, exercising regularly, limiting alcohol and stopping smoking. Regular physical exams and screening tests are another way to keep healthy. Preventive exams provided by your health care provider can find health problems before they become diseases or illnesses. Preventive services including immunizations, screening tests, monitoring and exams can help you take care of your own health. All people over age 72 should have a pneumovax  and and a prevnar shot to prevent pneumonia. These are once in a lifetime unless you and your provider decide differently. All people over 65 should have a yearly flu shot and a tetanus vaccine every 10 years. A bone mass density to screen for osteoporosis or thinning of the bones should be done every 2 years after 65. Screening for diabetes mellitus with a blood sugar test should be done every year. Glaucoma is a disease of the eye due to increased ocular pressure that can lead to blindness and it should be done every year by an eye professional.    Cardiovascular screening tests that check for elevated lipids (fatty part of blood) which can lead to heart disease and strokes should be done every 5 years. Colorectal screening that evaluates for blood or polyps in your colon should be done yearly as a stool test or every five years as a flexible sigmoidoscope or every 10 years as a colonoscopy up to age 76. Breast cancer screening with a mammogram is recommended biennially  for women age 54-69. Screening for cervical cancer with a pap smear and pelvic exam is recommended for women after age 72 years every 2 years up to age 79 or when the provider and patient decide to stop. If there is a history of cervical abnormalities or other increased risk for cancer then the test is recommended yearly.     Hepatitis C screening is also recommended for anyone born between [birth year] through [facility]. A shingles vaccine is also recommended once in a lifetime after age 61. Your Medicare Wellness Exam is recommended annually. Here is a list of your current Health Maintenance items with a due date:  Health Maintenance Due   Topic Date Due    Pneumococcal Vaccine (1 of 2 - PCV13) 06/01/2009    Stool testing for trace blood  09/24/2016    Flu Vaccine  08/01/2017    Hemoglobin A1C    11/22/2017    Diabetic Foot Care  11/28/2017    Annual Well Visit  03/14/2018          Colon Cancer Screening: Care Instructions  Your Care Instructions    Colorectal cancer occurs in the colon or rectum. That's the lower part of your digestive system. It is the second-leading cause of cancer deaths in the [place]. It often starts with small growths called polyps in the colon or rectum. Polyps are usually found with screening tests. Depending on the type of test, any polyps found may be removed during the tests. Colorectal cancer usually does not cause symptoms at first. But regular tests can help find it early, before it spreads and becomes harder to treat. Experts advise routine tests for colon cancer for people starting at age 48. And they advise people with a higher risk of colon cancer to get tested sooner. Talk with your doctor about when you should start testing. Discuss which tests you need. Follow-up care is a key part of your treatment and safety. Be sure to make and go to all appointments, and call your doctor if you are having problems. It's also a good idea to know your test results and keep a list of the medicines you take. What are the main screening tests for colon cancer? · Stool tests. These include the fecal immunochemical test (FIT) and the fecal occult blood test (FOBT). These tests check stool samples for signs of cancer. If your test is positive, you will need to have a colonoscopy. · Sigmoidoscopy.  This test lets your doctor look at the lining of your rectum and the lowest part of your colon. Your doctor uses a lighted tube called a sigmoidoscope. This test can't find cancers or polyps in the upper part of your colon. In some cases, polyps that are found can be removed. But if your doctor finds polyps, you will need to have a colonoscopy to check the upper part of your colon. · Colonoscopy. This test lets your doctor look at the lining of your rectum and your entire colon. The doctor uses a thin, flexible tool called a colonoscope. It can also be used to remove polyps or get a tissue sample (biopsy). What tests do you need? The following guidelines are for people age 48 and over who are not at high risk for colorectal cancer. You may have at least one of these tests as directed by your doctor. · Fecal immunochemical test (FIT) or fecal occult blood test (FOBT) every year  · Sigmoidoscopy every 5 years  · Colonoscopy every 10 years  If you are age 68 to 80, you can work with your doctor to decide if screening is a good option. If you are age 80 or older, your doctor will likely advise that screening is not helpful. Talk with your doctor about when you need to be tested. And discuss which tests are right for you. Your doctor may recommend earlier or more frequent testing if you:  · Have had colorectal cancer before. · Have had colon polyps. · Have symptoms of colorectal cancer. These include blood in your stool and changes in your bowel habits. · Have a parent, brother or sister, or child with colon polyps or colorectal cancer. · Have a bowel disease. This includes ulcerative colitis and Crohn's disease. · Have a rare polyp syndrome that runs in families, such as familial adenomatous polyposis (FAP). · Have had radiation treatments to the belly or pelvis. When should you call for help? Watch closely for changes in your health, and be sure to contact your doctor if:  ? · You have any changes in your bowel habits. ? · You have any problems.    Where can you learn more? Go to http://tierra-nguyen.info/. Enter M541 in the search box to learn more about \"Colon Cancer Screening: Care Instructions. \"  Current as of: May 12, 2017  Content Version: 11.4  © 0603-5434 [employer]. Care instructions adapted under license by [employer] (which disclaims liability or warranty for this information). If you have questions about a medical condition or this instruction, always ask your healthcare professional. [address] any warranty or liability for your use of this information.

## 2018-03-21 NOTE — TELEPHONE ENCOUNTER
Patient pharmacy is requesting a med refill of freestyle lite test strips    Last visit: yesterday    Last refill: yesterday    Freestyle test strips sent yesterday requires PA but patient uses freestyle lite kit at home. Per pharmacist he cannot change order. Need to be coming from provider.

## 2018-03-22 RX ORDER — INSULIN PUMP SYRINGE, 3 ML
EACH MISCELLANEOUS DAILY
Qty: 1 KIT | Refills: 0 | Status: SHIPPED | OUTPATIENT
Start: 2018-03-22 | End: 2019-12-17 | Stop reason: SDUPTHER

## 2018-03-22 RX ORDER — INSULIN PUMP SYRINGE, 3 ML
EACH MISCELLANEOUS
COMMUNITY
End: 2018-03-22 | Stop reason: SDUPTHER

## 2018-03-22 NOTE — TELEPHONE ENCOUNTER
Pharmacy run the [facility], insurance will not cover. Recommending true metrix or accu-check. Pended true metrix order.

## 2018-05-30 DIAGNOSIS — E78.00 PURE HYPERCHOLESTEROLEMIA: ICD-10-CM

## 2018-05-30 RX ORDER — SIMVASTATIN 20 MG/1
TABLET, FILM COATED ORAL
Qty: 90 TAB | Refills: 3 | Status: SHIPPED | OUTPATIENT
Start: 2018-05-30 | End: 2019-05-28 | Stop reason: SDUPTHER

## 2018-06-25 ENCOUNTER — HOSPITAL ENCOUNTER (OUTPATIENT)
Dept: LAB | Age: 73
Discharge: HOME | End: 2018-06-25

## 2018-06-25 PROCEDURE — 99001 SPECIMEN HANDLING PT-LAB: CPT | Performed by: INTERNAL MEDICINE

## 2018-06-27 LAB
ALBUMIN SERPL-MCNC: 4.3 G/DL (ref 3.5–4.8)
ALBUMIN/CREAT UR: 13.7 MG/G CREAT (ref 0–30)
ALBUMIN/GLOB SERPL: 2.2 {RATIO} (ref 1.2–2.2)
ALP SERPL-CCNC: 85 IU/L (ref 39–117)
ALT SERPL-CCNC: 17 IU/L (ref 0–32)
AST SERPL-CCNC: 12 IU/L (ref 0–40)
BILIRUB SERPL-MCNC: 0.3 MG/DL (ref 0–1.2)
BUN SERPL-MCNC: 27 MG/DL (ref 8–27)
BUN/CREAT SERPL: 29 (ref 12–28)
CALCIUM SERPL-MCNC: 9.5 MG/DL (ref 8.7–10.3)
CHLORIDE SERPL-SCNC: 104 MMOL/L (ref 96–106)
CHOLEST SERPL-MCNC: 134 MG/DL (ref 100–199)
CO2 SERPL-SCNC: 24 MMOL/L (ref 20–29)
CREAT SERPL-MCNC: 0.94 MG/DL (ref 0.57–1)
CREAT UR-MCNC: 136.2 MG/DL
ERYTHROCYTE [DISTWIDTH] IN BLOOD BY AUTOMATED COUNT: 13.3 % (ref 12.3–15.4)
GLOBULIN SER CALC-MCNC: 2 G/DL (ref 1.5–4.5)
GLUCOSE SERPL-MCNC: 140 MG/DL (ref 65–99)
HBA1C MFR BLD: 7 % (ref 4.8–5.6)
HCT VFR BLD AUTO: 39 % (ref 34–46.6)
HDLC SERPL-MCNC: 47 MG/DL
HGB BLD-MCNC: 12.5 G/DL (ref 11.1–15.9)
INTERPRETATION, 910389: NORMAL
INTERPRETATION: NORMAL
LDLC SERPL CALC-MCNC: 70 MG/DL (ref 0–99)
Lab: NORMAL
MCH RBC QN AUTO: 28.3 PG (ref 26.6–33)
MCHC RBC AUTO-ENTMCNC: 32.1 G/DL (ref 31.5–35.7)
MCV RBC AUTO: 88 FL (ref 79–97)
MICROALBUMIN UR-MCNC: 18.7 UG/ML
PDF IMAGE, 910387: NORMAL
PLATELET # BLD AUTO: 228 X10E3/UL (ref 150–379)
POTASSIUM SERPL-SCNC: 5 MMOL/L (ref 3.5–5.2)
PROT SERPL-MCNC: 6.3 G/DL (ref 6–8.5)
RBC # BLD AUTO: 4.41 X10E6/UL (ref 3.77–5.28)
SODIUM SERPL-SCNC: 144 MMOL/L (ref 134–144)
TRIGL SERPL-MCNC: 87 MG/DL (ref 0–149)
VLDLC SERPL CALC-MCNC: 17 MG/DL (ref 5–40)
WBC # BLD AUTO: 6.9 X10E3/UL (ref 3.4–10.8)

## 2018-07-20 ENCOUNTER — HOSPITAL ENCOUNTER (OUTPATIENT)
Dept: LAB | Age: 73
Discharge: HOME | End: 2018-07-20
Payer: MEDICARE

## 2018-07-20 ENCOUNTER — OFFICE VISIT (OUTPATIENT)
Dept: FAMILY MEDICINE CLINIC | Age: 73
End: 2018-07-20

## 2018-07-20 VITALS
TEMPERATURE: 97.8 F | RESPIRATION RATE: 17 BRPM | DIASTOLIC BLOOD PRESSURE: 70 MMHG | HEART RATE: 59 BPM | WEIGHT: 154 LBS | HEIGHT: 64 IN | BODY MASS INDEX: 26.29 KG/M2 | SYSTOLIC BLOOD PRESSURE: 130 MMHG | OXYGEN SATURATION: 97 %

## 2018-07-20 DIAGNOSIS — Z12.11 SCREEN FOR COLON CANCER: ICD-10-CM

## 2018-07-20 DIAGNOSIS — R30.0 DYSURIA: Primary | ICD-10-CM

## 2018-07-20 DIAGNOSIS — N30.00 ACUTE CYSTITIS WITHOUT HEMATURIA: ICD-10-CM

## 2018-07-20 LAB
BILIRUB UR QL STRIP: NEGATIVE
GLUCOSE UR-MCNC: NEGATIVE MG/DL
KETONES P FAST UR STRIP-MCNC: NEGATIVE MG/DL
PH UR STRIP: 7 [PH] (ref 4.6–8)
PROT UR QL STRIP: NORMAL
SP GR UR STRIP: 1.01 (ref 1–1.03)
UA UROBILINOGEN AMB POC: NORMAL (ref 0.2–1)
URINALYSIS CLARITY POC: NORMAL
URINALYSIS COLOR POC: NORMAL
URINE BLOOD POC: NORMAL
URINE LEUKOCYTES POC: NORMAL
URINE NITRITES POC: POSITIVE

## 2018-07-20 PROCEDURE — 87186 SC STD MICRODIL/AGAR DIL: CPT | Performed by: INTERNAL MEDICINE

## 2018-07-20 PROCEDURE — 87086 URINE CULTURE/COLONY COUNT: CPT | Performed by: INTERNAL MEDICINE

## 2018-07-20 PROCEDURE — 87077 CULTURE AEROBIC IDENTIFY: CPT | Performed by: INTERNAL MEDICINE

## 2018-07-20 RX ORDER — CIPROFLOXACIN 250 MG/1
250 TABLET, FILM COATED ORAL EVERY 12 HOURS
Qty: 6 TAB | Refills: 0 | Status: SHIPPED | OUTPATIENT
Start: 2018-07-20 | End: 2018-07-23

## 2018-07-20 NOTE — MR AVS SNAPSHOT
[address] [place] [address]  [address] [facility] [address] [phone] Patient: Stanford Khan MRN: [id number] TLV:[id number]/[id number] Visit Information Date & Time Provider Department Dept. Phone Encounter #  
 7/20/2018 11:45 AM Janay Franco, [address] [phone] [id number] Upcoming Health Maintenance Date Due FOBT Q 1 YEAR, 18+ 9/24/2016 Influenza Age 5 to Adult 8/1/2018 EYE EXAM RETINAL OR DILATED Q1 10/6/2018 HEMOGLOBIN A1C Q6M 12/25/2018 BREAST CANCER SCRN MAMMOGRAM 1/10/2019 Pneumococcal 65+ Low/Medium Risk (2 of 2 - PPSV23) 3/20/2019 FOOT EXAM Q1 3/20/2019 MEDICARE YEARLY EXAM 3/21/2019 MICROALBUMIN Q1 6/25/2019 LIPID PANEL Q1 6/25/2019 GLAUCOMA SCREENING Q2Y 10/6/2019 DTaP/Tdap/Td series (2 - Td) 6/1/2026 Allergies as of 7/20/2018  Review Complete On: 7/20/2018 By: Janay Franco MD  
  
 Severity Noted Reaction Type Reactions Fentanyl High 04/14/2016    Shortness of Breath Ace Inhibitors  12/11/2012    Cough Atenolol  08/25/2014    Cough Lisinopril  03/08/2013    Cough Current Immunizations  Never Reviewed No immunizations on file. Not reviewed this visit You Were Diagnosed With   
  
 Codes Comments Dysuria    -  Primary ICD-10-CM: R30.0 ICD-9-CM: 788.1 Screen for colon cancer     ICD-10-CM: Z12.11 ICD-9-CM: V76.51 Acute cystitis without hematuria     ICD-10-CM: N30.00 ICD-9-CM: 595.0 Vitals BP Pulse Temp Resp Height(growth percentile) Weight(growth percentile) 130/70 (BP 1 Location: Left arm, BP Patient Position: Sitting) (!) 59 97.8 °F (36.6 °C) (Oral) 17 5' 4\" (1.626 m) 154 lb (69.9 kg) SpO2 BMI OB Status Smoking Status 97% 26.43 kg/m2 Postmenopausal Former Smoker Vitals History BMI and BSA Data Body Mass Index Body Surface Area  
 26.43 kg/m 2 1.78 m 2 Preferred Pharmacy Pharmacy Name Phone [address], [address] [facility] [place] [address] [phone] Your Updated Medication List  
  
   
This list is accurate as of 7/20/18 12:01 PM.  Always use your most recent med list.  
  
  
  
  
 aspirin delayed-release 81 mg tablet Take  by mouth daily. Zi Aures 127-98-48 mg-mg-million Tab Take  by mouth. BIOTIN PO Take  by mouth. * Blood-Glucose Meter monitoring kit Free Style,Test bs daily. E11.22 * Blood-Glucose Meter monitoring kit Commonly known as:  TRUE METRIX AIR GLUCOSE METER  
by Other route daily. Check bs daily E11.22  
  
 cholecalciferol (vitamin D3) 2,000 unit Tab Take  by mouth daily. ciprofloxacin HCl 250 mg tablet Commonly known as:  CIPRO Take 1 Tab by mouth every twelve (12) hours for 3 days. * glucose blood VI test strips strip Commonly known as:  blood glucose test  
E11.22. Test bs daily * glucose blood VI test strips strip Commonly known as:  FREESTYLE TEST Dx: E11.22 test bs daily * glucose blood VI test strips strip Commonly known as:  FREESTYLE LITE STRIPS  
E 11.22 Test bs daily. * glucose blood VI test strips strip Commonly known as:  TRUE METRIX GLUCOSE TEST STRIP  
100 Each by Does Not Apply route daily. Check bs daily E11.22  
  
 hydroCHLOROthiazide 25 mg tablet Commonly known as:  HYDRODIURIL  
TAKE ONE TABLET BY MOUTH ONCE DAILY Lancets Misc Test bs daily. E11.22  
  
 losartan 100 mg tablet Commonly known as:  COZAAR  
TAKE ONE TABLET BY MOUTH ONCE DAILY  
  
 simvastatin 20 mg tablet Commonly known as:  ZOCOR  
TAKE ONE TABLET BY MOUTH ONCE DAILY AT BEDTIME  
  
 * Notice: This list has 6 medication(s) that are the same as other medications prescribed for you. Read the directions carefully, and ask your doctor or other care provider to review them with you. Prescriptions Sent to Pharmacy Refills ciprofloxacin HCl (CIPRO) 250 mg tablet 0 Sig: Take 1 Tab by mouth every twelve (12) hours for 3 days. Class: Normal  
 Pharmacy: [address], [address] [facility] #: [phone] Route: Oral  
  
We Performed the Following AMB POC URINALYSIS DIP STICK AUTO W/O MICRO [88108 CPT(R)] To-Do List   
 07/20/2018 Lab:  OCCULT BLOOD, IMMUNOASSAY (FIT) Introducing [facility]! Dear Sher Mcdonald: 
Thank you for requesting a [employer] account. Our records indicate that you already have an active [employer] account. You can access your account anytime at [email or web address] Did you know that you can access your hospital and ER discharge instructions at any time in [employer]? You can also review all of your test results from your hospital stay or ER visit. Additional Information If you have questions, please visit the Frequently Asked Questions section of the [employer] website at [email or web address] Remember, [employer] is NOT to be used for urgent needs. For medical emergencies, dial 911. Now available from your iPhone and Android! Please provide this summary of care documentation to your next provider. Your primary care clinician is listed as Rusty Peralta. If you have any questions after today's visit, please call [phone].

## 2018-07-22 LAB
BACTERIA SPEC CULT: ABNORMAL
SERVICE CMNT-IMP: ABNORMAL

## 2018-09-20 ENCOUNTER — HOSPITAL ENCOUNTER (OUTPATIENT)
Dept: LAB | Age: 73
Discharge: HOME | End: 2018-09-20
Payer: MEDICARE

## 2018-09-20 PROCEDURE — 82274 ASSAY TEST FOR BLOOD FECAL: CPT | Performed by: INTERNAL MEDICINE

## 2018-09-23 DIAGNOSIS — I10 ESSENTIAL HYPERTENSION WITH GOAL BLOOD PRESSURE LESS THAN 140/90: ICD-10-CM

## 2018-09-24 RX ORDER — LOSARTAN POTASSIUM 100 MG/1
TABLET ORAL
Qty: 90 TAB | Refills: 1 | Status: SHIPPED | OUTPATIENT
Start: 2018-09-24 | End: 2019-03-30 | Stop reason: SDUPTHER

## 2018-09-24 RX ORDER — HYDROCHLOROTHIAZIDE 25 MG/1
TABLET ORAL
Qty: 90 TAB | Refills: 1 | Status: SHIPPED | OUTPATIENT
Start: 2018-09-24 | End: 2019-03-31 | Stop reason: SDUPTHER

## 2018-09-26 LAB — HEMOCCULT STL QL IA: POSITIVE

## 2018-10-03 PROBLEM — R19.5 POSITIVE FECAL IMMUNOCHEMICAL TEST: Status: ACTIVE | Noted: 2018-10-03

## 2018-10-19 DIAGNOSIS — Z12.39 SCREENING FOR BREAST CANCER: Primary | ICD-10-CM

## 2018-11-26 ENCOUNTER — OFFICE VISIT (OUTPATIENT)
Dept: FAMILY MEDICINE CLINIC | Age: 73
End: 2018-11-26

## 2018-11-26 VITALS
WEIGHT: 151.8 LBS | RESPIRATION RATE: 20 BRPM | HEIGHT: 64 IN | SYSTOLIC BLOOD PRESSURE: 110 MMHG | HEART RATE: 76 BPM | TEMPERATURE: 98.9 F | OXYGEN SATURATION: 99 % | DIASTOLIC BLOOD PRESSURE: 68 MMHG | BODY MASS INDEX: 25.92 KG/M2

## 2018-11-26 DIAGNOSIS — R05.9 COUGH: Primary | ICD-10-CM

## 2018-11-26 RX ORDER — AZITHROMYCIN 250 MG/1
TABLET, FILM COATED ORAL
Qty: 6 TAB | Refills: 0 | Status: SHIPPED | OUTPATIENT
Start: 2018-11-26 | End: 2018-12-01

## 2018-11-26 NOTE — PROGRESS NOTES
Verenice Myaer is a 75 y.o. female (: 1945) presenting to address: Chief Complaint Patient presents with  Cough  Chest Congestion Vitals:  
 18 1148 BP: 110/68 Pulse: 76 Resp: 20 Temp: 98.9 °F (37.2 °C) TempSrc: Oral  
SpO2: 99% Weight: 151 lb 12.8 oz (68.9 kg) Height: 5' 4\" (1.626 m) PainSc:   0 - No pain Learning Assessment:  
 
Learning Assessment 2015 PRIMARY LEARNER Patient HIGHEST LEVEL OF EDUCATION - PRIMARY LEARNER  SOME COLLEGE  
BARRIERS PRIMARY LEARNER NONE  
CO-LEARNER CAREGIVER No  
PRIMARY LANGUAGE ENGLISH  
LEARNER PREFERENCE PRIMARY VIDEOS  
ANSWERED BY self RELATIONSHIP SELF Depression Screening: PHQ over the last two weeks 2018 Little interest or pleasure in doing things Not at all Feeling down, depressed, irritable, or hopeless Not at all Total Score PHQ 2 0 Fall Risk Assessment:  
 
Fall Risk Assessment, last 12 mths 2018 Able to walk? Yes Fall in past 12 months? Yes Fall with injury? No  
Number of falls in past 12 months 1 Fall Risk Score 1 Abuse Screening:  
 
Abuse Screening Questionnaire 2018 Do you ever feel afraid of your partner? Camila Guzman Are you in a relationship with someone who physically or mentally threatens you? Camila Guzman Is it safe for you to go home? [facility] Coordination of Care Questionaire: 1. Have you been to the ER, urgent care clinic since your last visit? Hospitalized since your last visit? NO 
 
2. Have you seen or consulted any other health care providers outside of the [address] [place] [address] since your last visit? Include any pap smears or colon screening. YES gastroenterology 18 Advanced Directive: 1. Do you have an Advanced Directive? YES 
 
2. Would you like information on Advanced Directives?  NO

## 2018-11-26 NOTE — PROGRESS NOTES
Chief Complaint Patient presents with  Cough  Chest Congestion Assessment/Plan 1. Cough 
-mucinex otc.  zpack 
- azithromycin (ZITHROMAX) 250 mg tablet; Take two tablets today then one tablet daily  Dispense: 6 Tab; Refill: 0 The plan was discussed with the patient. The patient verbalized understanding and is in agreement with the plan. All medication potential side effects were discussed with the patient. SUBJECTIVE:  
Tino Richards is a 75 y.o. female who complains of chest tightness, non-productive cough x 5days. No f/c. She initially started getting better, but now sx have gotten worse. She has a choir concert upcoming and \"needs to be better\" Review of Systems - ENT ROS: negative Respiratory ROS: positive for - cough Cardiovascular ROS: no chest pain or dyspnea on exertion Gastrointestinal ROS: no abdominal pain, change in bowel habits, or black or bloody stools Musculoskeletal ROS: negative Neurological ROS: no TIA or stroke symptoms Physical Examination:  
Visit Vitals /68 (BP 1 Location: Left arm, BP Patient Position: Sitting) Pulse 76 Temp 98.9 °F (37.2 °C) (Oral) Resp 20 Ht 5' 4\" (1.626 m) Wt 151 lb 12.8 oz (68.9 kg) SpO2 99% BMI 26.06 kg/m² Constitutional: Well developed, nourished, no distress, alert HENT: Exterior ears and tympanic membranes normal bilaterally. Supple neck. No thyromegaly or lymphadenopathy. Oropharynx clear and moist mucous membranes. Eyes: Conjunctiva normal. PERRL. CV: S1, S2.  RRR. No murmurs/rubs. No thrills palpated. No carotid bruits. Intact distal pulses. No edema. Pulm: No abnormalities on inspection. Clear to auscultation bilaterally. No wheezing/rhonchi. Normal effort.     
 
 
 
 
Ginette Good MD

## 2018-12-18 DIAGNOSIS — Z12.39 SCREENING FOR BREAST CANCER: ICD-10-CM

## 2019-03-30 DIAGNOSIS — I10 ESSENTIAL HYPERTENSION WITH GOAL BLOOD PRESSURE LESS THAN 140/90: ICD-10-CM

## 2019-03-31 DIAGNOSIS — I10 ESSENTIAL HYPERTENSION WITH GOAL BLOOD PRESSURE LESS THAN 140/90: ICD-10-CM

## 2019-04-01 RX ORDER — LOSARTAN POTASSIUM 100 MG/1
TABLET ORAL
Qty: 90 TAB | Refills: 1 | Status: SHIPPED | OUTPATIENT
Start: 2019-04-01 | End: 2019-12-17 | Stop reason: SDUPTHER

## 2019-04-01 RX ORDER — HYDROCHLOROTHIAZIDE 25 MG/1
TABLET ORAL
Qty: 90 TAB | Refills: 1 | Status: SHIPPED | OUTPATIENT
Start: 2019-04-01 | End: 2019-09-30 | Stop reason: SDUPTHER

## 2019-04-24 RX ORDER — CALCIUM CITRATE/VITAMIN D3 200MG-6.25
TABLET ORAL
Qty: 100 STRIP | Refills: 3 | Status: SHIPPED | OUTPATIENT
Start: 2019-04-24 | End: 2021-02-01 | Stop reason: SDUPTHER

## 2019-05-28 DIAGNOSIS — E78.00 PURE HYPERCHOLESTEROLEMIA: ICD-10-CM

## 2019-05-28 NOTE — TELEPHONE ENCOUNTER
[employer] is requesting a med refill of Zocor 20 mg  Last filled: 5/30/18  Last visit: 11/26/18  Next appointment: none

## 2019-05-29 RX ORDER — SIMVASTATIN 20 MG/1
TABLET, FILM COATED ORAL
Qty: 90 TAB | Refills: 0 | Status: SHIPPED | OUTPATIENT
Start: 2019-05-29 | End: 2019-08-31 | Stop reason: SDUPTHER

## 2019-07-22 DIAGNOSIS — E78.00 PURE HYPERCHOLESTEROLEMIA: ICD-10-CM

## 2019-07-22 DIAGNOSIS — N18.30 TYPE 2 DIABETES MELLITUS WITH STAGE 3 CHRONIC KIDNEY DISEASE, WITHOUT LONG-TERM CURRENT USE OF INSULIN (HCC): Primary | ICD-10-CM

## 2019-07-22 DIAGNOSIS — I10 ESSENTIAL HYPERTENSION: ICD-10-CM

## 2019-07-22 DIAGNOSIS — E11.22 TYPE 2 DIABETES MELLITUS WITH STAGE 3 CHRONIC KIDNEY DISEASE, WITHOUT LONG-TERM CURRENT USE OF INSULIN (HCC): Primary | ICD-10-CM

## 2019-12-03 DIAGNOSIS — E78.00 PURE HYPERCHOLESTEROLEMIA: ICD-10-CM

## 2019-12-03 RX ORDER — SIMVASTATIN 20 MG/1
TABLET, FILM COATED ORAL
Qty: 90 TAB | Refills: 0 | Status: SHIPPED | OUTPATIENT
Start: 2019-12-03 | End: 2019-12-17 | Stop reason: SDUPTHER

## 2019-12-13 ENCOUNTER — HOSPITAL ENCOUNTER (OUTPATIENT)
Dept: LAB | Age: 74
Discharge: HOME | End: 2019-12-13
Payer: MEDICARE

## 2019-12-13 DIAGNOSIS — N18.30 TYPE 2 DIABETES MELLITUS WITH STAGE 3 CHRONIC KIDNEY DISEASE, WITHOUT LONG-TERM CURRENT USE OF INSULIN (HCC): ICD-10-CM

## 2019-12-13 DIAGNOSIS — I10 ESSENTIAL HYPERTENSION: ICD-10-CM

## 2019-12-13 DIAGNOSIS — E78.00 PURE HYPERCHOLESTEROLEMIA: ICD-10-CM

## 2019-12-13 DIAGNOSIS — E11.22 TYPE 2 DIABETES MELLITUS WITH STAGE 3 CHRONIC KIDNEY DISEASE, WITHOUT LONG-TERM CURRENT USE OF INSULIN (HCC): ICD-10-CM

## 2019-12-13 LAB
ALBUMIN SERPL-MCNC: 4 G/DL (ref 3.4–5)
ALBUMIN/GLOB SERPL: 1.6 {RATIO} (ref 0.8–1.7)
ALP SERPL-CCNC: 74 U/L (ref 45–117)
ALT SERPL-CCNC: 23 U/L (ref 13–56)
ANION GAP SERPL CALC-SCNC: 2 MMOL/L (ref 3–18)
AST SERPL-CCNC: 11 U/L (ref 10–38)
BILIRUB SERPL-MCNC: 0.9 MG/DL (ref 0.2–1)
BUN SERPL-MCNC: 26 MG/DL (ref 7–18)
BUN/CREAT SERPL: 24 (ref 12–20)
CALCIUM SERPL-MCNC: 9.9 MG/DL (ref 8.5–10.1)
CHLORIDE SERPL-SCNC: 106 MMOL/L (ref 100–111)
CHOLEST SERPL-MCNC: 156 MG/DL
CO2 SERPL-SCNC: 32 MMOL/L (ref 21–32)
CREAT SERPL-MCNC: 1.08 MG/DL (ref 0.6–1.3)
ERYTHROCYTE [DISTWIDTH] IN BLOOD BY AUTOMATED COUNT: 13.2 % (ref 11.6–14.5)
GLOBULIN SER CALC-MCNC: 2.5 G/DL (ref 2–4)
GLUCOSE SERPL-MCNC: 162 MG/DL (ref 74–99)
HBA1C MFR BLD: 7.5 % (ref 4.2–5.6)
HCT VFR BLD AUTO: 41.2 % (ref 35–45)
HDLC SERPL-MCNC: 51 MG/DL (ref 40–60)
HDLC SERPL: 3.1 {RATIO} (ref 0–5)
HGB BLD-MCNC: 12.7 G/DL (ref 12–16)
LDLC SERPL CALC-MCNC: 79 MG/DL (ref 0–100)
LIPID PROFILE,FLP: NORMAL
MCH RBC QN AUTO: 28.7 PG (ref 24–34)
MCHC RBC AUTO-ENTMCNC: 30.8 G/DL (ref 31–37)
MCV RBC AUTO: 93.2 FL (ref 74–97)
PLATELET # BLD AUTO: 239 K/UL (ref 135–420)
PMV BLD AUTO: 11.4 FL (ref 9.2–11.8)
POTASSIUM SERPL-SCNC: 4.8 MMOL/L (ref 3.5–5.5)
PROT SERPL-MCNC: 6.5 G/DL (ref 6.4–8.2)
RBC # BLD AUTO: 4.42 M/UL (ref 4.2–5.3)
SODIUM SERPL-SCNC: 140 MMOL/L (ref 136–145)
TRIGL SERPL-MCNC: 130 MG/DL (ref ?–150)
VLDLC SERPL CALC-MCNC: 26 MG/DL
WBC # BLD AUTO: 5.6 K/UL (ref 4.6–13.2)

## 2019-12-13 PROCEDURE — 85027 COMPLETE CBC AUTOMATED: CPT

## 2019-12-13 PROCEDURE — 36415 COLL VENOUS BLD VENIPUNCTURE: CPT

## 2019-12-13 PROCEDURE — 83036 HEMOGLOBIN GLYCOSYLATED A1C: CPT

## 2019-12-13 PROCEDURE — 80061 LIPID PANEL: CPT

## 2019-12-13 PROCEDURE — 80053 COMPREHEN METABOLIC PANEL: CPT

## 2019-12-13 PROCEDURE — 82043 UR ALBUMIN QUANTITATIVE: CPT

## 2019-12-14 LAB
CREAT UR-MCNC: 123 MG/DL (ref 30–125)
MICROALBUMIN UR-MCNC: 2.92 MG/DL (ref 0–3)
MICROALBUMIN/CREAT UR-RTO: 24 MG/G (ref 0–30)

## 2019-12-17 ENCOUNTER — OFFICE VISIT (OUTPATIENT)
Dept: FAMILY MEDICINE CLINIC | Age: 74
End: 2019-12-17

## 2019-12-17 VITALS
DIASTOLIC BLOOD PRESSURE: 59 MMHG | RESPIRATION RATE: 16 BRPM | HEART RATE: 60 BPM | SYSTOLIC BLOOD PRESSURE: 135 MMHG | TEMPERATURE: 97.7 F | OXYGEN SATURATION: 99 % | WEIGHT: 152 LBS | BODY MASS INDEX: 26.93 KG/M2 | HEIGHT: 63 IN

## 2019-12-17 DIAGNOSIS — I10 ESSENTIAL HYPERTENSION: ICD-10-CM

## 2019-12-17 DIAGNOSIS — E78.00 PURE HYPERCHOLESTEROLEMIA: ICD-10-CM

## 2019-12-17 DIAGNOSIS — N18.30 TYPE 2 DIABETES MELLITUS WITH STAGE 3 CHRONIC KIDNEY DISEASE, WITHOUT LONG-TERM CURRENT USE OF INSULIN (HCC): ICD-10-CM

## 2019-12-17 DIAGNOSIS — I10 ESSENTIAL HYPERTENSION WITH GOAL BLOOD PRESSURE LESS THAN 140/90: ICD-10-CM

## 2019-12-17 DIAGNOSIS — Z78.0 POSTMENOPAUSAL: ICD-10-CM

## 2019-12-17 DIAGNOSIS — Z00.00 MEDICARE ANNUAL WELLNESS VISIT, SUBSEQUENT: Primary | ICD-10-CM

## 2019-12-17 DIAGNOSIS — E11.22 TYPE 2 DIABETES MELLITUS WITH STAGE 3 CHRONIC KIDNEY DISEASE, WITHOUT LONG-TERM CURRENT USE OF INSULIN (HCC): ICD-10-CM

## 2019-12-17 PROBLEM — R19.5 POSITIVE FECAL IMMUNOCHEMICAL TEST: Status: RESOLVED | Noted: 2018-10-03 | Resolved: 2019-12-17

## 2019-12-17 RX ORDER — SIMVASTATIN 20 MG/1
20 TABLET, FILM COATED ORAL
Qty: 90 TAB | Refills: 3 | Status: SHIPPED | OUTPATIENT
Start: 2019-12-17 | End: 2020-12-21 | Stop reason: SDUPTHER

## 2019-12-17 RX ORDER — HYDROCHLOROTHIAZIDE 25 MG/1
TABLET ORAL
Qty: 90 TAB | Refills: 3 | Status: SHIPPED | OUTPATIENT
Start: 2019-12-17 | End: 2020-12-21 | Stop reason: SDUPTHER

## 2019-12-17 RX ORDER — LOSARTAN POTASSIUM 100 MG/1
TABLET ORAL
Qty: 90 TAB | Refills: 3 | Status: SHIPPED | OUTPATIENT
Start: 2019-12-17 | End: 2020-12-14

## 2019-12-17 NOTE — PROGRESS NOTES
This is the Subsequent Medicare Annual Wellness Exam, performed 12 months or more after the Initial AWV or the last Subsequent AWV    I have reviewed the patient's medical history in detail and updated the computerized patient record. History     Patient Active Problem List   Diagnosis Code    HTN (hypertension) I10    HLD (hyperlipidemia) E78.5    DMII (diabetes mellitus, type 2) ([facility] E11.9    Vitamin D deficiency E55.9    Osteoarthritis of neck M47.812    CKD (chronic kidney disease) stage 3, GFR 30-59 ml/min ([facility]) N18.3    Neural foraminal stenosis of lumbar spine J74.38    Umbilical hernia J17.6    23-polyvalent pneumococcal polysaccharide vaccine declined Z28.21    DNR (do not resuscitate) Z66    Positive fecal immunochemical test R19.5     Past Medical History:   Diagnosis Date    Diabetes ([facility] [address]     Hypercholesterolemia     Hypertension       Past Surgical History:   Procedure Laterality Date    HX COLONOSCOPY  2005    HX GI  02/02/2017    hernia surgery     Current Outpatient Medications   Medication Sig Dispense Refill    simvastatin (ZOCOR) 20 mg tablet TAKE 1 TABLET BY MOUTH ONCE DAILY AT BEDTIME 90 Tab 0    hydroCHLOROthiazide (HYDRODIURIL) 25 mg tablet TAKE 1 TABLET BY MOUTH ONCE DAILY 90 Tab 0    TRUE METRIX GLUCOSE TEST STRIP strip USE TEST STRIPS TO CHECK BLOOD SUGAR ONCE DAILY 100 Strip 3    losartan (COZAAR) 100 mg tablet TAKE 1 TABLET BY MOUTH ONCE DAILY 90 Tab 1    Blood-Glucose Meter (TRUE METRIX AIR GLUCOSE METER) monitoring kit by Other route daily. Check bs daily  E11.22 1 Kit 0    glucose blood VI test strips (BLOOD GLUCOSE TEST) strip E11.22. Test bs daily 100 Strip 11    Blood-Glucose Meter monitoring kit Free Style,Test bs daily. E11.22 1 Kit 0    Lancets misc Test bs daily. E11.22 100 Each 11    BIOTIN PO Take  by mouth.  cholecalciferol, vitamin D3, 2,000 unit Tab Take  by mouth daily.        Allergies   Allergen Reactions    Fentanyl Shortness of Breath    Ace Inhibitors Cough    Atenolol Cough    Lisinopril Cough       Family History   Problem Relation Age of Onset    Heart Disease Mother         pacemaker    Stroke Father      Social History     Tobacco Use    Smoking status: Former Smoker    Smokeless tobacco: Never Used   Substance Use Topics    Alcohol use: No     Alcohol/week: 0.0 standard drinks       Depression Risk Factor Screening:     3 most recent PHQ Screens 12/17/2019   Little interest or pleasure in doing things Not at all   Feeling down, depressed, irritable, or hopeless Not at all   Total Score PHQ 2 0       Alcohol Risk Factor Screening:   Do you average 1 drink per night or more than 7 drinks a week:  No    On any one occasion in the past three months have you have had more than 3 drinks containing alcohol:  No      Functional Ability and Level of Safety:   Hearing: Hearing is good. Activities of Daily Living: The home contains: handrails  Patient does total self care    Ambulation: with no difficulty    Fall Risk:  Fall Risk Assessment, last 12 mths 12/17/2019   Able to walk? Yes   Fall in past 12 months? Yes   Fall with injury? No   Number of falls in past 12 months -   Fall Risk Score -       Abuse Screen:  Patient is not abused    Cognitive Screening   Has your family/caregiver stated any concerns about your memory: no  Cognitive Screening: Normal - . Patient Care Team   Patient Care Team:  Eliane Chawla MD as PCP - General (Internal Medicine)  Eliane Chawla MD as PCP - REHABILITATION [facility] Empaneled Provider    Assessment/Plan   Education and counseling provided:  Are appropriate based on today's review and evaluation  End-of-Life planning (with patient's consent)    Diagnoses and all orders for this visit:    1. Medicare annual wellness visit, subsequent    2. Type 2 diabetes mellitus with stage 3 chronic kidney disease, without long-term current use of insulin ([facility])  -      DIABETES FOOT EXAM    3. Essential hypertension    4. Pure hypercholesterolemia  -     simvastatin (ZOCOR) 20 mg tablet; Take 1 Tab by mouth nightly. 5. Essential hypertension with goal blood pressure less than 140/90  -     hydroCHLOROthiazide (HYDRODIURIL) 25 mg tablet; TAKE 1 TABLET BY MOUTH ONCE DAILY  -     losartan (COZAAR) 100 mg tablet; TAKE 1 TABLET BY MOUTH ONCE DAILY    6. Postmenopausal  -     DEXA BONE DENSITY STUDY AXIAL; Future        There are no preventive care reminders to display for this patient.

## 2019-12-17 NOTE — PATIENT INSTRUCTIONS
Medicare Wellness Visit, Female The best way to live healthy is to have a lifestyle where you eat a well-balanced diet, exercise regularly, limit alcohol use, and quit all forms of tobacco/nicotine, if applicable. Regular preventive services are another way to keep healthy. Preventive services (vaccines, screening tests, monitoring & exams) can help personalize your care plan, which helps you manage your own care. Screening tests can find health problems at the earliest stages, when they are easiest to treat. [facility] follows the current, evidence-based guidelines published by the [facility] Bernabe Mcneill ([facility]) when recommending preventive services for our patients. Because we follow these guidelines, sometimes recommendations change over time as research supports it. (For example, mammograms used to be recommended annually. Even though Medicare will still pay for an annual mammogram, the newer guidelines recommend a mammogram every two years for women of average risk). Of course, you and your doctor may decide to screen more often for some diseases, based on your risk and your co-morbidities (chronic disease you are already diagnosed with). Preventive services for you include: - Medicare offers their members a free annual wellness visit, which is time for you and your primary care provider to discuss and plan for your preventive service needs. Take advantage of this benefit every year! 
-All adults over the age of 72 should receive the recommended pneumonia vaccines. Current USPSTF guidelines recommend a series of two vaccines for the best pneumonia protection.  
-All adults should have a flu vaccine yearly and a tetanus vaccine every 10 years.  
-All adults age 48 and older should receive the shingles vaccines (series of two vaccines). -All adults age 38-68 who are overweight should have a diabetes screening test once every three years. -All adults born between [birth year] and 1965 should be screened once for Hepatitis C. 
-Other screening tests and preventive services for persons with diabetes include: an eye exam to screen for diabetic retinopathy, a kidney function test, a foot exam, and stricter control over your cholesterol.  
-Cardiovascular screening for adults with routine risk involves an electrocardiogram (ECG) at intervals determined by your doctor.  
-Colorectal cancer screenings should be done for adults age 54-65 with no increased risk factors for colorectal cancer. There are a number of acceptable methods of screening for this type of cancer. Each test has its own benefits and drawbacks. Discuss with your doctor what is most appropriate for you during your annual wellness visit. The different tests include: colonoscopy (considered the best screening method), a fecal occult blood test, a fecal DNA test, and sigmoidoscopy. 
 
-A bone mass density test is recommended when a woman turns 65 to screen for osteoporosis. This test is only recommended one time, as a screening. Some providers will use this same test as a disease monitoring tool if you already have osteoporosis. -Breast cancer screenings are recommended every other year for women of normal risk, age 54-69. 
-Cervical cancer screenings for women over age 72 are only recommended with certain risk factors. Here is a list of your current Health Maintenance items (your personalized list of preventive services) with a due date: 
Health Maintenance Due Topic Date Due  Shingles Vaccine (1 of 2) 06/01/1994  Pneumococcal Vaccine (1 of 1 - PPSV23) 06/01/2009 [facility] Diabetic Foot Care  03/20/2019 [facility] Annual Well Visit  03/21/2019  Flu Vaccine  08/01/2019  Stool testing for trace blood  09/20/2019 Learning About Diabetes Food Guidelines Your Care Instructions Meal planning is important to manage diabetes.  It helps keep your blood sugar at a target level (which you set with your doctor). You don't have to eat special foods. You can eat what your family eats, including sweets once in a while. But you do have to pay attention to how often you eat and how much you eat of certain foods. You may want to work with a dietitian or a certified diabetes educator (CDE) to help you plan meals and snacks. A dietitian or CDE can also help you lose weight if that is one of your goals. What should you know about eating carbs? Managing the amount of carbohydrate (carbs) you eat is an important part of healthy meals when you have diabetes. Carbohydrate is found in many foods. · Learn which foods have carbs. And learn the amounts of carbs in different foods. ? Bread, cereal, pasta, and rice have about 15 grams of carbs in a serving. A serving is 1 slice of bread (1 ounce), ½ cup of cooked cereal, or 1/3 cup of cooked pasta or rice. ? Fruits have 15 grams of carbs in a serving. A serving is 1 small fresh fruit, such as an apple or orange; ½ of a banana; ½ cup of cooked or canned fruit; ½ cup of fruit juice; 1 cup of melon or raspberries; or 2 tablespoons of dried fruit. ? Milk and no-sugar-added yogurt have 15 grams of carbs in a serving. A serving is 1 cup of milk or 2/3 cup of no-sugar-added yogurt. ? Starchy vegetables have 15 grams of carbs in a serving. A serving is ½ cup of mashed potatoes or sweet potato; 1 cup winter squash; ½ of a small baked potato; ½ cup of cooked beans; or ½ cup cooked corn or green peas. · Learn how much carbs to eat each day and at each meal. A dietitian or CDE can teach you how to keep track of the amount of carbs you eat. This is called carbohydrate counting. · If you are not sure how to count carbohydrate grams, use the Plate Method to plan meals. It is a good, quick way to make sure that you have a balanced meal. It also helps you spread carbs throughout the day. ? Divide your plate by types of foods. Put non-starchy vegetables on half the plate, meat or other protein food on one-quarter of the plate, and a grain or starchy vegetable in the final quarter of the plate. To this you can add a small piece of fruit and 1 cup of milk or yogurt, depending on how many carbs you are supposed to eat at a meal. 
· Try to eat about the same amount of carbs at each meal. Do not \"save up\" your daily allowance of carbs to eat at one meal. 
· Proteins have very little or no carbs per serving. Examples of proteins are beef, chicken, turkey, fish, eggs, tofu, cheese, cottage cheese, and peanut butter. A serving size of meat is 3 ounces, which is about the size of a deck of cards. Examples of meat substitute serving sizes (equal to 1 ounce of meat) are 1/4 cup of cottage cheese, 1 egg, 1 tablespoon of peanut butter, and ½ cup of tofu. How can you eat out and still eat healthy? · Learn to estimate the serving sizes of foods that have carbohydrate. If you measure food at home, it will be easier to estimate the amount in a serving of restaurant food. · If the meal you order has too much carbohydrate (such as potatoes, corn, or baked beans), ask to have a low-carbohydrate food instead. Ask for a salad or green vegetables. · If you use insulin, check your blood sugar before and after eating out to help you plan how much to eat in the future. · If you eat more carbohydrate at a meal than you had planned, take a walk or do other exercise. This will help lower your blood sugar. What else should you know? · Limit saturated fat, such as the fat from meat and dairy products. This is a healthy choice because people who have diabetes are at higher risk of heart disease. So choose lean cuts of meat and nonfat or low-fat dairy products. Use olive or canola oil instead of butter or shortening when cooking. · Don't skip meals.  Your blood sugar may drop too low if you skip meals and take insulin or certain medicines for diabetes. · Check with your doctor before you drink alcohol. Alcohol can cause your blood sugar to drop too low. Alcohol can also cause a bad reaction if you take certain diabetes medicines. Follow-up care is a key part of your treatment and safety. Be sure to make and go to all appointments, and call your doctor if you are having problems. It's also a good idea to know your test results and keep a list of the medicines you take. Where can you learn more? Go to http://tierra-nguyen.info/. Enter O253 in the search box to learn more about \"Learning About Diabetes Food Guidelines. \" Current as of: April 16, 2019 Content Version: 12.2 © 9133-4015 [employer]. Care instructions adapted under license by [employer] (which disclaims liability or warranty for this information). If you have questions about a medical condition or this instruction, always ask your healthcare professional. [address] any warranty or liability for your use of this information.

## 2019-12-17 NOTE — PROGRESS NOTES
Mammogram done Dec 2019 , dental this week   Patrice Crouch is a 75 y.o. female (: 1945) presenting to address:    Chief Complaint   Patient presents with    Annual Wellness Visit       Vitals:    19 0828   BP: 135/59   Pulse: 60   Resp: 16   Temp: 97.7 °F (36.5 °C)   TempSrc: Oral   SpO2: 99%   Weight: 152 lb (68.9 kg)   Height: 5' 3\" (1.6 m)   PainSc:   0 - No pain       Hearing/Vision:      Visual Acuity Screening    Right eye Left eye Both eyes   Without correction:      With correction: 20/25 20/70 20/20       Learning Assessment:     Learning Assessment 2015   PRIMARY LEARNER Patient   HIGHEST LEVEL OF EDUCATION - PRIMARY LEARNER  SOME COLLEGE   BARRIERS PRIMARY LEARNER NONE   CO-LEARNER CAREGIVER No   PRIMARY LANGUAGE ENGLISH   LEARNER PREFERENCE PRIMARY VIDEOS   ANSWERED BY self   RELATIONSHIP SELF     Depression Screening:     3 most recent PHQ Screens 2019   Little interest or pleasure in doing things Not at all   Feeling down, depressed, irritable, or hopeless Not at all   Total Score PHQ 2 0     Fall Risk Assessment:     Fall Risk Assessment, last 12 mths 2019   Able to walk? Yes   Fall in past 12 months? Yes   Fall with injury? No   Number of falls in past 12 months -   Fall Risk Score -     Abuse Screening:     Abuse Screening Questionnaire 2019   Do you ever feel afraid of your partner? N   Are you in a relationship with someone who physically or mentally threatens you? N   Is it safe for you to go home? Y     Coordination of Care Questionaire:   1. Have you been to the ER, urgent care clinic since your last visit? Hospitalized since your last visit? No     2. Have you seen or consulted any other health care providers outside of the [address] [place] [address] since your last visit? Include any pap smears or colon screening. No   Advanced Directive:   1. Do you have an Advanced Directive ? Yes at home     2. Would you like information on Advanced Directives? No     Health Maintenance Due   Topic Date Due    FOBT Q 1 YEAR, 18+  09/20/2019

## 2020-03-22 NOTE — PROGRESS NOTES
Chief Complaint   Patient presents with    Dysuria     x 3 days        ASSESSMENT/PLAN  1. Dysuria  - AMB POC URINALYSIS DIP STICK AUTO W/O MICRO    2. Screen for colon cancer  - OCCULT BLOOD, IMMUNOASSAY (FIT); Future    3. Acute cystitis without hematuria  - ciprofloxacin HCl (CIPRO) 250 mg tablet; Take 1 Tab by mouth every twelve (12) hours for 3 days. Dispense: 6 Tab; Refill: 0  - CULTURE, URINE; Future    Push fluids, may use Pyridium OTC prn. Call or return to clinic prn if these symptoms worsen or fail to improve as anticipated. The plan was discussed with the patient. The patient verbalized understanding and is in agreement with the plan. All medication potential side effects were discussed with the patient. SUBJECTIVE: Gayle Mccarty is a 75 y.o. female who complains of urinary frequency, urgency and dysuria x 3 days (had been having sx longer, but they were intermittent), without flank pain, fever, chills, or abnormal vaginal discharge or bleeding. OBJECTIVE:   Visit Vitals    /70 (BP 1 Location: Left arm, BP Patient Position: Sitting)    Pulse (!) 59    Temp 97.8 °F (36.6 °C) (Oral)    Resp 17    Ht 5' 4\" (1.626 m)    Wt 154 lb (69.9 kg)    SpO2 97%    BMI 26.43 kg/m2       Gen: Appears well, in no apparent distress. CV: RRR  Pulm: CTA bilaterally. No wheezes/rales/crackles. : No CVA tenderness or inguinal adenopathy noted. Urine dipstick shows positive for leukocytes.            Mirna Hardwick MD
Dalia Hilario is a 75 y.o. female (: 1945) presenting to address:    Chief Complaint   Patient presents with    Dysuria     x 3 days        Vitals:    18 1144   BP: 130/70   Pulse: (!) 59   Resp: 17   Temp: 97.8 °F (36.6 °C)   TempSrc: Oral   SpO2: 97%   Weight: 154 lb (69.9 kg)   Height: 5' 4\" (1.626 m)   PainSc:   5   PainLoc: Pelvic       Hearing/Vision:   No exam data present    Learning Assessment:     Learning Assessment 2015   PRIMARY LEARNER Patient   HIGHEST LEVEL OF EDUCATION - PRIMARY LEARNER  SOME COLLEGE   BARRIERS PRIMARY LEARNER NONE   CO-LEARNER CAREGIVER No   PRIMARY LANGUAGE ENGLISH   LEARNER PREFERENCE PRIMARY VIDEOS   ANSWERED BY self   RELATIONSHIP SELF     Depression Screening:     PHQ over the last two weeks 2018   Little interest or pleasure in doing things Not at all   Feeling down, depressed, irritable, or hopeless Not at all   Total Score PHQ 2 0     Fall Risk Assessment:     Fall Risk Assessment, last 12 mths 2018   Able to walk? Yes   Fall in past 12 months? Yes   Fall with injury? No   Number of falls in past 12 months 1   Fall Risk Score 1     Abuse Screening:     Abuse Screening Questionnaire 2018   Do you ever feel afraid of your partner? N   Are you in a relationship with someone who physically or mentally threatens you? N   Is it safe for you to go home? Y     Coordination of Care Questionaire:   1. Have you been to the ER, urgent care clinic since your last visit? Hospitalized since your last visit? No   2. Have you seen or consulted any other health care providers outside of the [facility] since your last visit? Include any pap smears or colon screening. Yes, Dr Dann Arreguin, hernia surgeon follow up  2018     Advanced Directive:   1. Do you have an Advanced Directive? Yes     2. Would you like information on Advanced Directives?  No
Negative

## 2020-12-02 ENCOUNTER — TELEPHONE (OUTPATIENT)
Dept: FAMILY MEDICINE CLINIC | Age: 75
End: 2020-12-02

## 2020-12-02 DIAGNOSIS — I10 ESSENTIAL HYPERTENSION: Primary | ICD-10-CM

## 2020-12-02 DIAGNOSIS — N18.31 TYPE 2 DIABETES MELLITUS WITH STAGE 3A CHRONIC KIDNEY DISEASE, WITHOUT LONG-TERM CURRENT USE OF INSULIN (HCC): ICD-10-CM

## 2020-12-02 DIAGNOSIS — E11.22 TYPE 2 DIABETES MELLITUS WITH STAGE 3A CHRONIC KIDNEY DISEASE, WITHOUT LONG-TERM CURRENT USE OF INSULIN (HCC): ICD-10-CM

## 2020-12-02 NOTE — TELEPHONE ENCOUNTER
Patient called to r/s cpe and also scheduled labs 1 week before appointment, please place labs    Future Appointments   Date Time Provider Teo Calderón   12/15/2020  9:10 AM [facility] AMB   12/21/2020  8:30 AM Jose Warren MD [facility] AMB

## 2020-12-13 DIAGNOSIS — I10 ESSENTIAL HYPERTENSION WITH GOAL BLOOD PRESSURE LESS THAN 140/90: ICD-10-CM

## 2020-12-14 RX ORDER — LOSARTAN POTASSIUM 100 MG/1
TABLET ORAL
Qty: 90 TAB | Refills: 0 | Status: SHIPPED | OUTPATIENT
Start: 2020-12-14 | End: 2020-12-21 | Stop reason: SDUPTHER

## 2020-12-15 ENCOUNTER — APPOINTMENT (OUTPATIENT)
Dept: FAMILY MEDICINE CLINIC | Age: 75
End: 2020-12-15

## 2020-12-15 DIAGNOSIS — E11.22 TYPE 2 DIABETES MELLITUS WITH STAGE 3A CHRONIC KIDNEY DISEASE, WITHOUT LONG-TERM CURRENT USE OF INSULIN (HCC): ICD-10-CM

## 2020-12-15 DIAGNOSIS — I10 ESSENTIAL HYPERTENSION: ICD-10-CM

## 2020-12-15 DIAGNOSIS — N18.31 TYPE 2 DIABETES MELLITUS WITH STAGE 3A CHRONIC KIDNEY DISEASE, WITHOUT LONG-TERM CURRENT USE OF INSULIN (HCC): ICD-10-CM

## 2020-12-17 LAB
ALBUMIN SERPL-MCNC: 4.3 G/DL (ref 3.7–4.7)
ALBUMIN SERPL-MCNC: NORMAL G/DL
ALBUMIN/CREAT UR: 17 MG/G CREAT (ref 0–29)
ALBUMIN/GLOB SERPL: 2.2 {RATIO} (ref 1.2–2.2)
ALP SERPL-CCNC: 79 IU/L (ref 39–117)
ALP SERPL-CCNC: NORMAL U/L
ALT SERPL-CCNC: 23 IU/L (ref 0–32)
ALT SERPL-CCNC: NORMAL U/L
AST SERPL-CCNC: 13 IU/L (ref 0–40)
AST SERPL-CCNC: NORMAL U/L
BILIRUB SERPL-MCNC: 0.8 MG/DL (ref 0–1.2)
BILIRUB SERPL-MCNC: NORMAL MG/DL
BUN SERPL-MCNC: 30 MG/DL (ref 8–27)
BUN SERPL-MCNC: NORMAL MG/DL
BUN/CREAT SERPL: 28 (ref 12–28)
CALCIUM SERPL-MCNC: 9.3 MG/DL (ref 8.7–10.3)
CALCIUM SERPL-MCNC: NORMAL MG/DL
CHLORIDE SERPL-SCNC: 102 MMOL/L (ref 96–106)
CHLORIDE SERPL-SCNC: NORMAL MMOL/L
CHOLEST SERPL-MCNC: 133 MG/DL (ref 100–199)
CHOLEST SERPL-MCNC: NORMAL MG/DL
CO2 SERPL-SCNC: 24 MMOL/L (ref 20–29)
CO2 SERPL-SCNC: NORMAL MMOL/L
CREAT SERPL-MCNC: 1.07 MG/DL (ref 0.57–1)
CREAT SERPL-MCNC: NORMAL MG/DL
CREAT UR-MCNC: 101.4 MG/DL
CREAT UR-MCNC: NORMAL MG/DL
ERYTHROCYTE [DISTWIDTH] IN BLOOD BY AUTOMATED COUNT: 12.2 % (ref 11.7–15.4)
EST. AVERAGE GLUCOSE BLD GHB EST-MCNC: 177 MG/DL
GLOBULIN SER CALC-MCNC: 2 G/DL (ref 1.5–4.5)
GLUCOSE SERPL-MCNC: 160 MG/DL (ref 65–99)
GLUCOSE SERPL-MCNC: NORMAL MG/DL
HBA1C MFR BLD: 7.8 % (ref 4.8–5.6)
HBA1C MFR BLD: NORMAL %
HCT VFR BLD AUTO: 39.1 % (ref 34–46.6)
HCT VFR BLD AUTO: NORMAL %
HDLC SERPL-MCNC: 48 MG/DL
HDLC SERPL-MCNC: NORMAL MG/DL
HGB BLD-MCNC: 12.7 G/DL (ref 11.1–15.9)
HGB BLD-MCNC: NORMAL G/DL
INTERPRETATION, 910389: NORMAL
INTERPRETATION, 910389: NORMAL
INTERPRETATION: NORMAL
LDLC SERPL CALC-MCNC: 67 MG/DL (ref 0–99)
Lab: NORMAL
Lab: NORMAL
MCH RBC QN AUTO: 28.3 PG (ref 26.6–33)
MCHC RBC AUTO-ENTMCNC: 32.5 G/DL (ref 31.5–35.7)
MCV RBC AUTO: 87 FL (ref 79–97)
MICROALBUMIN UR-MCNC: 17.2 UG/ML
NRBC BLD AUTO-RTO: NORMAL %
PDF IMAGE, 910387: NORMAL
PDF IMAGE, 910387: NORMAL
PLATELET # BLD AUTO: 237 X10E3/UL (ref 150–450)
PLATELET # BLD AUTO: NORMAL 10*3/UL
POTASSIUM SERPL-SCNC: 4.1 MMOL/L (ref 3.5–5.2)
POTASSIUM SERPL-SCNC: NORMAL MMOL/L
PROT SERPL-MCNC: 6.3 G/DL (ref 6–8.5)
PROT SERPL-MCNC: NORMAL G/DL
RBC # BLD AUTO: 4.48 X10E6/UL (ref 3.77–5.28)
RBC # BLD AUTO: NORMAL 10*6/UL
SODIUM SERPL-SCNC: 140 MMOL/L (ref 134–144)
SODIUM SERPL-SCNC: NORMAL MMOL/L
TRIGL SERPL-MCNC: 96 MG/DL (ref 0–149)
TRIGL SERPL-MCNC: NORMAL MG/DL (ref ?–150)
VLDLC SERPL CALC-MCNC: 18 MG/DL (ref 5–40)
VLDLC SERPL CALC-MCNC: NORMAL MG/DL
WBC # BLD AUTO: 6.7 X10E3/UL (ref 3.4–10.8)
WBC # BLD AUTO: NORMAL X10E3/UL

## 2020-12-21 ENCOUNTER — OFFICE VISIT (OUTPATIENT)
Dept: FAMILY MEDICINE CLINIC | Age: 75
End: 2020-12-21
Payer: MEDICARE

## 2020-12-21 VITALS
HEIGHT: 63 IN | HEART RATE: 70 BPM | SYSTOLIC BLOOD PRESSURE: 136 MMHG | OXYGEN SATURATION: 100 % | DIASTOLIC BLOOD PRESSURE: 60 MMHG | RESPIRATION RATE: 18 BRPM | TEMPERATURE: 97 F | BODY MASS INDEX: 26.44 KG/M2 | WEIGHT: 149.2 LBS

## 2020-12-21 DIAGNOSIS — E78.00 PURE HYPERCHOLESTEROLEMIA: ICD-10-CM

## 2020-12-21 DIAGNOSIS — N18.31 TYPE 2 DIABETES MELLITUS WITH STAGE 3A CHRONIC KIDNEY DISEASE, WITHOUT LONG-TERM CURRENT USE OF INSULIN (HCC): ICD-10-CM

## 2020-12-21 DIAGNOSIS — I10 ESSENTIAL HYPERTENSION WITH GOAL BLOOD PRESSURE LESS THAN 140/90: ICD-10-CM

## 2020-12-21 DIAGNOSIS — E11.22 TYPE 2 DIABETES MELLITUS WITH STAGE 3A CHRONIC KIDNEY DISEASE, WITHOUT LONG-TERM CURRENT USE OF INSULIN (HCC): ICD-10-CM

## 2020-12-21 DIAGNOSIS — Z00.00 MEDICARE ANNUAL WELLNESS VISIT, SUBSEQUENT: Primary | ICD-10-CM

## 2020-12-21 PROCEDURE — G8752 SYS BP LESS 140: HCPCS | Performed by: INTERNAL MEDICINE

## 2020-12-21 PROCEDURE — G8754 DIAS BP LESS 90: HCPCS | Performed by: INTERNAL MEDICINE

## 2020-12-21 PROCEDURE — G0439 PPPS, SUBSEQ VISIT: HCPCS | Performed by: INTERNAL MEDICINE

## 2020-12-21 PROCEDURE — 1101F PT FALLS ASSESS-DOCD LE1/YR: CPT | Performed by: INTERNAL MEDICINE

## 2020-12-21 PROCEDURE — 3051F HG A1C>EQUAL 7.0%<8.0%: CPT | Performed by: INTERNAL MEDICINE

## 2020-12-21 PROCEDURE — G8399 PT W/DXA RESULTS DOCUMENT: HCPCS | Performed by: INTERNAL MEDICINE

## 2020-12-21 PROCEDURE — G8419 CALC BMI OUT NRM PARAM NOF/U: HCPCS | Performed by: INTERNAL MEDICINE

## 2020-12-21 PROCEDURE — G8510 SCR DEP NEG, NO PLAN REQD: HCPCS | Performed by: INTERNAL MEDICINE

## 2020-12-21 PROCEDURE — G8427 DOCREV CUR MEDS BY ELIG CLIN: HCPCS | Performed by: INTERNAL MEDICINE

## 2020-12-21 PROCEDURE — G8536 NO DOC ELDER MAL SCRN: HCPCS | Performed by: INTERNAL MEDICINE

## 2020-12-21 RX ORDER — LOSARTAN POTASSIUM 100 MG/1
TABLET ORAL
Qty: 90 TAB | Refills: 3 | Status: SHIPPED | OUTPATIENT
Start: 2020-12-21

## 2020-12-21 RX ORDER — SIMVASTATIN 20 MG/1
20 TABLET, FILM COATED ORAL
Qty: 90 TAB | Refills: 3 | Status: SHIPPED | OUTPATIENT
Start: 2020-12-21

## 2020-12-21 RX ORDER — HYDROCHLOROTHIAZIDE 25 MG/1
TABLET ORAL
Qty: 90 TAB | Refills: 3 | Status: SHIPPED | OUTPATIENT
Start: 2020-12-21

## 2020-12-21 RX ORDER — METFORMIN HYDROCHLORIDE 500 MG/1
500 TABLET, EXTENDED RELEASE ORAL
Qty: 90 TAB | Refills: 1 | Status: SHIPPED | OUTPATIENT
Start: 2020-12-21 | End: 2021-02-01

## 2020-12-21 NOTE — PATIENT INSTRUCTIONS
Medicare Wellness Visit, Female The best way to live healthy is to have a lifestyle where you eat a well-balanced diet, exercise regularly, limit alcohol use, and quit all forms of tobacco/nicotine, if applicable. Regular preventive services are another way to keep healthy. Preventive services (vaccines, screening tests, monitoring & exams) can help personalize your care plan, which helps you manage your own care. Screening tests can find health problems at the earliest stages, when they are easiest to treat. [facility] follows the current, evidence-based guidelines published by the [facility] Bernabe Mcneill ([facility]) when recommending preventive services for our patients. Because we follow these guidelines, sometimes recommendations change over time as research supports it. (For example, mammograms used to be recommended annually. Even though Medicare will still pay for an annual mammogram, the newer guidelines recommend a mammogram every two years for women of average risk). Of course, you and your doctor may decide to screen more often for some diseases, based on your risk and your co-morbidities (chronic disease you are already diagnosed with). Preventive services for you include: - Medicare offers their members a free annual wellness visit, which is time for you and your primary care provider to discuss and plan for your preventive service needs. Take advantage of this benefit every year! 
-All adults over the age of 72 should receive the recommended pneumonia vaccines. Current USPSTF guidelines recommend a series of two vaccines for the best pneumonia protection.  
-All adults should have a flu vaccine yearly and a tetanus vaccine every 10 years.  
-All adults age 48 and older should receive the shingles vaccines (series of two vaccines). -All adults age 38-68 who are overweight should have a diabetes screening test once every three years. -All adults born between 80 and 1965 should be screened once for Hepatitis C. 
-Other screening tests and preventive services for persons with diabetes include: an eye exam to screen for diabetic retinopathy, a kidney function test, a foot exam, and stricter control over your cholesterol.  
-Cardiovascular screening for adults with routine risk involves an electrocardiogram (ECG) at intervals determined by your doctor.  
-Colorectal cancer screenings should be done for adults age 54-65 with no increased risk factors for colorectal cancer. There are a number of acceptable methods of screening for this type of cancer. Each test has its own benefits and drawbacks. Discuss with your doctor what is most appropriate for you during your annual wellness visit. The different tests include: colonoscopy (considered the best screening method), a fecal occult blood test, a fecal DNA test, and sigmoidoscopy. 
 
-A bone mass density test is recommended when a woman turns 65 to screen for osteoporosis. This test is only recommended one time, as a screening. Some providers will use this same test as a disease monitoring tool if you already have osteoporosis. -Breast cancer screenings are recommended every other year for women of normal risk, age 54-69. 
-Cervical cancer screenings for women over age 72 are only recommended with certain risk factors. Here is a list of your current Health Maintenance items (your personalized list of preventive services) with a due date: 
Health Maintenance Due Topic Date Due  
 Diabetic Foot Care  12/17/2020 Learning About Meal Planning for Diabetes Why plan your meals? Meal planning can be a key part of managing diabetes. Planning meals and snacks with the right balance of carbohydrate, protein, and fat can help you keep your blood sugar at the target level you set with your doctor. You don't have to eat special foods. You can eat what your family eats, including sweets once in a while. But you do have to pay attention to how often you eat and how much you eat of certain foods. You may want to work with a dietitian or a certified diabetes educator. He or she can give you tips and meal ideas and can answer your questions about meal planning. This health professional can also help you reach a healthy weight if that is one of your goals. What plan is right for you? Your dietitian or diabetes educator may suggest that you start with the plate format or carbohydrate counting. The plate format The plate format is a simple way to help you manage how you eat. You plan meals by learning how much space each food should take on a plate. Using the plate format helps you spread carbohydrate throughout the day. It can make it easier to keep your blood sugar level within your target range. It also helps you see if you're eating healthy portion sizes. To use the plate format, you put non-starchy vegetables on half your plate. Add meat or meat substitutes on one-quarter of the plate. Put a grain or starchy vegetable (such as brown rice or a potato) on the final quarter of the plate. You can add a small piece of fruit and some low-fat or fat-free milk or yogurt, depending on your carbohydrate goal for each meal. 
Here are some tips for using the plate format: · Make sure that you are not using an oversized plate. A 9-inch plate is best. Many restaurants use larger plates. · Get used to using the plate format at home. Then you can use it when you eat out. · Write down your questions about using the plate format. Talk to your doctor, a dietitian, or a diabetes educator about your concerns. Carbohydrate counting With carbohydrate counting, you plan meals based on the amount of carbohydrate in each food. Carbohydrate raises blood sugar higher and more quickly than any other nutrient. It is found in desserts, breads and cereals, and fruit. It's also found in starchy vegetables such as potatoes and corn, grains such as rice and pasta, and milk and yogurt. Spreading carbohydrate throughout the day helps keep your blood sugar levels within your target range. Your daily amount depends on several things, including your weight, how active you are, which diabetes medicines you take, and what your goals are for your blood sugar levels. A registered dietitian or diabetes educator can help you plan how much carbohydrate to include in each meal and snack. A guideline for your daily amount of carbohydrate is: · 45 to 60 grams at each meal. That's about the same as 3 to 4 carbohydrate servings. · 15 to 20 grams at each snack. That's about the same as 1 carbohydrate serving. The Nutrition Facts label on packaged foods tells you how much carbohydrate is in a serving of the food. First, look at the serving size on the food label. Is that the amount you eat in a serving? All of the nutrition information on a food label is based on that serving size. So if you eat more or less than that, you'll need to adjust the other numbers. Total carbohydrate is the next thing you need to look for on the label. If you count carbohydrate servings, one serving of carbohydrate is 15 grams. For foods that don't come with labels, such as fresh fruits and vegetables, you'll need a guide that lists carbohydrate in these foods. Ask your doctor, dietitian, or diabetes educator about books or other nutrition guides you can use. If you take insulin, you need to know how many grams of carbohydrate are in a meal. This lets you know how much rapid-acting insulin to take before you eat. If you use an insulin pump, you get a constant rate of insulin during the day. So the pump must be programmed at meals to give you extra insulin to cover the rise in blood sugar after meals. When you know how much carbohydrate you will eat, you can take the right amount of insulin. Or, if you always use the same amount of insulin, you need to make sure that you eat the same amount of carbohydrate at meals. If you need more help to understand carbohydrate counting and food labels, ask your doctor, dietitian, or diabetes educator. How do you get started with meal planning? Here are some tips to get started: 
· Plan your meals a week at a time. Don't forget to include snacks too. · Use cookbooks or online recipes to plan several main meals. Plan some quick meals for busy nights. You also can double some recipes that freeze well. Then you can save half for other busy nights when you don't have time to cook. · Make sure you have the ingredients you need for your recipes. If you're running low on basic items, put these items on your shopping list too. · List foods that you use to make breakfasts, lunches, and snacks. List plenty of fruits and vegetables. · Post this list on the refrigerator. Add to it as you think of more things you need. · Take the list to the store to do your weekly shopping. Follow-up care is a key part of your treatment and safety. Be sure to make and go to all appointments, and call your doctor if you are having problems. It's also a good idea to know your test results and keep a list of the medicines you take. Where can you learn more? Go to http://www.gray.com/ Albino Purchase in the search box to learn more about \"Learning About Meal Planning for Diabetes. \" 
 Current as of: December 20, 2019               Content Version: 12.6 © 5483-2729 [employer]. Care instructions adapted under license by [employer] (which disclaims liability or warranty for this information). If you have questions about a medical condition or this instruction, always ask your healthcare professional. [address] any warranty or liability for your use of this information.

## 2020-12-21 NOTE — PROGRESS NOTES
This is the Subsequent Medicare Annual Wellness Exam, performed 12 months or more after the Initial AWV or the last Subsequent AWV    I have reviewed the patient's medical history in detail and updated the computerized patient record. Depression Risk Factor Screening:     3 most recent PHQ Screens 12/21/2020   Little interest or pleasure in doing things Not at all   Feeling down, depressed, irritable, or hopeless Not at all   Total Score PHQ 2 0     Alcohol Risk Screen    Do you average more than 1 drink per night or more than 7 drinks a week:  No    On any one occasion in the past three months have you have had more than 3 drinks containing alcohol:  No    Functional Ability and Level of Safety:    Hearing: Hearing is good. Activities of Daily Living: The home contains: no safety equipment. Patient does total self care      Ambulation: with no difficulty     Fall Risk:  Fall Risk Assessment, last 12 mths 12/21/2020   Able to walk? Yes   Fall in past 12 months? No   Fall with injury? -   Number of falls in past 12 months -   Fall Risk Score -      Abuse Screen:  Patient is not abused       Cognitive Screening    Has your family/caregiver stated any concerns about your memory: no     Cognitive Screening: . Assessment/Plan   Education and counseling provided:  Are appropriate based on today's review and evaluation    Diagnoses and all orders for this visit:    1. Medicare annual wellness visit, subsequent    2. Type 2 diabetes mellitus with stage 3a chronic kidney disease, without long-term current use of insulin ([facility])  -      DIABETES FOOT EXAM  -     metFORMIN ER (GLUCOPHAGE XR) 500 mg tablet; Take 1 Tab by mouth daily (with dinner). 3. Essential hypertension with goal blood pressure less than 140/90  -     losartan (COZAAR) 100 mg tablet;  Take 1 tablet by mouth once daily  -     hydroCHLOROthiazide (HYDRODIURIL) 25 mg tablet; TAKE 1 TABLET BY MOUTH ONCE DAILY    4. Pure hypercholesterolemia  - simvastatin (ZOCOR) 20 mg tablet; Take 1 Tab by mouth nightly. Health Maintenance Due     There are no preventive care reminders to display for this patient. Patient Care Team   Patient Care Team:  Sugar Yang MD as PCP - General (Internal Medicine)  Sugar Yang MD as PCP - [facility] Empaneled Provider    History     Patient Active Problem List   Diagnosis Code    HTN (hypertension) I10    HLD (hyperlipidemia) E78.5    DMII (diabetes mellitus, type 2) ([facility] [address] E11.9    Vitamin D deficiency E55.9    Osteoarthritis of neck M47.812    CKD (chronic kidney disease) stage 3, GFR 30-59 ml/min N18.30    Neural foraminal stenosis of lumbar spine O26.55    Umbilical hernia W34.7    23-polyvalent pneumococcal polysaccharide vaccine declined Z28.21    DNR (do not resuscitate) Z66     Past Medical History:   Diagnosis Date    Diabetes ([facility] [address]     Hypercholesterolemia     Hypertension       Past Surgical History:   Procedure Laterality Date    HX COLONOSCOPY  2005    HX GI  02/02/2017    hernia surgery     Current Outpatient Medications   Medication Sig Dispense Refill    losartan (COZAAR) 100 mg tablet Take 1 tablet by mouth once daily 90 Tab 0    simvastatin (ZOCOR) 20 mg tablet Take 1 Tab by mouth nightly. 90 Tab 3    hydroCHLOROthiazide (HYDRODIURIL) 25 mg tablet TAKE 1 TABLET BY MOUTH ONCE DAILY 90 Tab 3    TRUE METRIX GLUCOSE TEST STRIP strip USE TEST STRIPS TO CHECK BLOOD SUGAR ONCE DAILY 100 Strip 3    Lancets misc Test bs daily. E11.22 100 Each 11    BIOTIN PO Take  by mouth.  cholecalciferol, vitamin D3, 2,000 unit Tab Take  by mouth daily.        Allergies   Allergen Reactions    Fentanyl Shortness of Breath    Ace Inhibitors Cough    Atenolol Cough    Lisinopril Cough       Family History   Problem Relation Age of Onset    Heart Disease Mother         pacemaker    Stroke Father     Heart Disease Brother      Social History     Tobacco Use    Smoking status: Former Smoker Packs/day: 0.01     Years: 3.00     Pack years: 0.03     Quit date: 1970     Years since quittin.0    Smokeless tobacco: Never Used   Substance Use Topics    Alcohol use: No     Alcohol/week: 0.0 standard drinks

## 2020-12-21 NOTE — PROGRESS NOTES
No ED, urgent care, yes dental, mammogram   Devon Barbosa is a 67 y.o. female (: 1945) presenting to address:    Chief Complaint   Patient presents with    Welcome To Medicare       Vitals:    20 0834 20 0840   BP: (!) 149/69 136/60   Pulse: 70    Resp: 18    Temp: 97 °F (36.1 °C)    SpO2: 100%    Weight: 149 lb 3.2 oz (67.7 kg)    Height: 5' 3\" (1.6 m)    PainSc:   9    PainLoc: Generalized        Hearing/Vision:      Hearing Screening    125Hz 250Hz 500Hz 1000Hz 2000Hz 3000Hz 4000Hz 6000Hz 8000Hz   Right ear:            Left ear:               Visual Acuity Screening    Right eye Left eye Both eyes   Without correction:      With correction: 20/30 20/100 20/30       Learning Assessment:     Learning Assessment 2015   PRIMARY LEARNER Patient   HIGHEST LEVEL OF EDUCATION - PRIMARY LEARNER  SOME COLLEGE   BARRIERS PRIMARY LEARNER NONE   CO-LEARNER CAREGIVER No   PRIMARY LANGUAGE ENGLISH   LEARNER PREFERENCE PRIMARY VIDEOS   ANSWERED BY self   RELATIONSHIP SELF     Depression Screening:     3 most recent PHQ Screens 2020   Little interest or pleasure in doing things Not at all   Feeling down, depressed, irritable, or hopeless Not at all   Total Score PHQ 2 0     Fall Risk Assessment:     Fall Risk Assessment, last 12 mths 2020   Able to walk? Yes   Fall in past 12 months? No   Fall with injury? -   Number of falls in past 12 months -   Fall Risk Score -     Abuse Screening:     Abuse Screening Questionnaire 2020   Do you ever feel afraid of your partner? N   Are you in a relationship with someone who physically or mentally threatens you? N   Is it safe for you to go home? Y     Coordination of Care Questionaire:   1. Have you been to the ER, urgent care clinic since your last visit? Hospitalized since your last visit? No     2. Have you seen or consulted any other health care providers outside of the [address] [place] [address] since your last visit?   Include any pap smears or colon screening. Yes   Advanced Directive:   1. Do you have an Advanced Directive? No     2. Would you like information on Advanced Directives?    No     Health Maintenance Due   Topic Date Due    Shingrix Vaccine Age 49> (1 of 2) 06/01/1994    Pneumococcal 65+ years (1 of 1 - PPSV23) 06/01/2009    Flu Vaccine (1) 09/01/2020    Foot Exam Q1  12/17/2020    Medicare Yearly Exam  12/17/2020     Declines flu

## 2021-01-18 ENCOUNTER — TELEPHONE (OUTPATIENT)
Dept: FAMILY MEDICINE CLINIC | Age: 76
End: 2021-01-18

## 2021-01-18 NOTE — TELEPHONE ENCOUNTER
Pt is a former Dr Kylah Garay patient who is planning on GILBERT to Dr Migue Chavez. She is calling because her bs has been high and she has been experiencing lightheadness for about a week. She isn't sure if she needs to change her metformin medication. I did tell her that she is probably going to need to be seen first before anybody can change her meds. She wanted to come into the office. We don't have anything available this week and she isn't sure if she can wait next week to be seen. She wants to know if there is anything that can be done without having to be seen. Please advise.

## 2021-01-20 NOTE — TELEPHONE ENCOUNTER
Called patient and left message for her to return call to find out what her blood sugar readings have been. Also if find out if she has checked her blood pressure during these episodes of dizziness.

## 2021-01-22 NOTE — TELEPHONE ENCOUNTER
Spoke to patient and scheduled     Future Appointments   Date Time Provider Teo Calderón   2/1/2021  1:30 PM Ananth Bates MD [facility]

## 2021-02-01 ENCOUNTER — OFFICE VISIT (OUTPATIENT)
Dept: FAMILY MEDICINE CLINIC | Age: 76
End: 2021-02-01
Payer: MEDICARE

## 2021-02-01 ENCOUNTER — TELEPHONE (OUTPATIENT)
Dept: FAMILY MEDICINE CLINIC | Age: 76
End: 2021-02-01

## 2021-02-01 VITALS
OXYGEN SATURATION: 100 % | DIASTOLIC BLOOD PRESSURE: 82 MMHG | TEMPERATURE: 97.8 F | HEART RATE: 76 BPM | BODY MASS INDEX: 25.8 KG/M2 | HEIGHT: 63 IN | SYSTOLIC BLOOD PRESSURE: 160 MMHG | RESPIRATION RATE: 14 BRPM | WEIGHT: 145.6 LBS

## 2021-02-01 DIAGNOSIS — N18.30 TYPE 2 DIABETES MELLITUS WITH STAGE 3 CHRONIC KIDNEY DISEASE, WITHOUT LONG-TERM CURRENT USE OF INSULIN (HCC): ICD-10-CM

## 2021-02-01 DIAGNOSIS — R09.89 WIDENED PULSE PRESSURE: ICD-10-CM

## 2021-02-01 DIAGNOSIS — I10 ESSENTIAL HYPERTENSION: Primary | ICD-10-CM

## 2021-02-01 DIAGNOSIS — R42 DIZZINESS: ICD-10-CM

## 2021-02-01 DIAGNOSIS — E11.22 TYPE 2 DIABETES MELLITUS WITH STAGE 3 CHRONIC KIDNEY DISEASE, WITHOUT LONG-TERM CURRENT USE OF INSULIN (HCC): ICD-10-CM

## 2021-02-01 PROCEDURE — G8427 DOCREV CUR MEDS BY ELIG CLIN: HCPCS | Performed by: LEGAL MEDICINE

## 2021-02-01 PROCEDURE — G8399 PT W/DXA RESULTS DOCUMENT: HCPCS | Performed by: LEGAL MEDICINE

## 2021-02-01 PROCEDURE — G8752 SYS BP LESS 140: HCPCS | Performed by: LEGAL MEDICINE

## 2021-02-01 PROCEDURE — G8754 DIAS BP LESS 90: HCPCS | Performed by: LEGAL MEDICINE

## 2021-02-01 PROCEDURE — 1090F PRES/ABSN URINE INCON ASSESS: CPT | Performed by: LEGAL MEDICINE

## 2021-02-01 PROCEDURE — G8419 CALC BMI OUT NRM PARAM NOF/U: HCPCS | Performed by: LEGAL MEDICINE

## 2021-02-01 PROCEDURE — G8510 SCR DEP NEG, NO PLAN REQD: HCPCS | Performed by: LEGAL MEDICINE

## 2021-02-01 PROCEDURE — G8536 NO DOC ELDER MAL SCRN: HCPCS | Performed by: LEGAL MEDICINE

## 2021-02-01 PROCEDURE — 93000 ELECTROCARDIOGRAM COMPLETE: CPT | Performed by: LEGAL MEDICINE

## 2021-02-01 PROCEDURE — 1101F PT FALLS ASSESS-DOCD LE1/YR: CPT | Performed by: LEGAL MEDICINE

## 2021-02-01 PROCEDURE — 99214 OFFICE O/P EST MOD 30 MIN: CPT | Performed by: LEGAL MEDICINE

## 2021-02-01 RX ORDER — CALCIUM CITRATE/VITAMIN D3 200MG-6.25
200 TABLET ORAL SEE ADMIN INSTRUCTIONS
Qty: 200 STRIP | Refills: 3 | Status: SHIPPED | OUTPATIENT
Start: 2021-02-01 | End: 2021-02-05 | Stop reason: SDUPTHER

## 2021-02-01 RX ORDER — LANCETS
EACH MISCELLANEOUS
Qty: 100 EACH | Refills: 11 | Status: SHIPPED | OUTPATIENT
Start: 2021-02-01 | End: 2021-02-05 | Stop reason: SDUPTHER

## 2021-02-01 NOTE — PATIENT INSTRUCTIONS
Low Sodium Diet (2,000 Milligram): Care Instructions Your Care Instructions Too much sodium causes your body to hold on to extra water. This can raise your blood pressure and force your heart and kidneys to work harder. In very serious cases, this could cause you to be put in the hospital. It might even be life-threatening. By limiting sodium, you will feel better and lower your risk of serious problems. The most common source of sodium is salt. People get most of the salt in their diet from canned, prepared, and packaged foods. Fast food and restaurant meals also are very high in sodium. Your doctor will probably limit your sodium to less than 2,000 milligrams (mg) a day. This limit counts all the sodium in prepared and packaged foods and any salt you add to your food. Follow-up care is a key part of your treatment and safety. Be sure to make and go to all appointments, and call your doctor if you are having problems. It's also a good idea to know your test results and keep a list of the medicines you take. How can you care for yourself at home? Read food labels · Read labels on cans and food packages. The labels tell you how much sodium is in each serving. Make sure that you look at the serving size. If you eat more than the serving size, you have eaten more sodium. · Food labels also tell you the Percent Daily Value for sodium. Choose products with low Percent Daily Values for sodium. · Be aware that sodium can come in forms other than salt, including monosodium glutamate (MSG), sodium citrate, and sodium bicarbonate (baking soda). MSG is often added to Asian food. When you eat out, you can sometimes ask for food without MSG or added salt. Buy low-sodium foods · Buy foods that are labeled \"unsalted\" (no salt added), \"sodium-free\" (less than 5 mg of sodium per serving), or \"low-sodium\" (less than 140 mg of sodium per serving). Foods labeled \"reduced-sodium\" and \"light sodium\" may still have too much sodium. Be sure to read the label to see how much sodium you are getting. · Buy fresh vegetables, or frozen vegetables without added sauces. Buy low-sodium versions of canned vegetables, soups, and other canned goods. Prepare low-sodium meals · Cut back on the amount of salt you use in cooking. This will help you adjust to the taste. Do not add salt after cooking. One teaspoon of salt has about 2,300 mg of sodium. · Take the salt shaker off the table. · Flavor your food with garlic, lemon juice, onion, vinegar, herbs, and spices. Do not use soy sauce, lite soy sauce, steak sauce, onion salt, garlic salt, celery salt, mustard, or ketchup on your food. · Use low-sodium salad dressings, sauces, and ketchup. Or make your own salad dressings and sauces without adding salt. · Use less salt (or none) when recipes call for it. You can often use half the salt a recipe calls for without losing flavor. Other foods such as rice, pasta, and grains do not need added salt. · Rinse canned vegetables, and cook them in fresh water. This removes somebut not allof the salt. · Avoid water that is naturally high in sodium or that has been treated with water softeners, which add sodium. Call your local water company to find out the sodium content of your water supply. If you buy bottled water, read the label and choose a sodium-free brand. Avoid high-sodium foods · Avoid eating: 
? Smoked, cured, salted, and canned meat, fish, and poultry. ? Ham, barber, hot dogs, and luncheon meats. ? Regular, hard, and processed cheese and regular peanut butter. ? Crackers with salted tops, and other salted snack foods such as pretzels, chips, and salted popcorn. ? Frozen prepared meals, unless labeled low-sodium. ? Canned and dried soups, broths, and bouillon, unless labeled sodium-free or low-sodium. ? Canned vegetables, unless labeled sodium-free or low-sodium. ? [place]ha fries, pizza, tacos, and other fast foods. ? Pickles, olives, ketchup, and other condiments, especially soy sauce, unless labeled sodium-free or low-sodium. Where can you learn more? Go to http://www.gray.com/ Enter N725 in the search box to learn more about \"Low Sodium Diet (2,000 Milligram): Care Instructions. \" Current as of: August 22, 2019               Content Version: 12.6 © 8043-4390 [employer]. Care instructions adapted under license by [employer] (which disclaims liability or warranty for this information). If you have questions about a medical condition or this instruction, always ask your healthcare professional. [place] [address] any warranty or liability for your use of this information. Learning About Low-Sodium Foods What foods are low in sodium? The foods you eat contain nutrients, such as vitamins and minerals. Sodium is a nutrient. Your body needs the right amount to stay healthy and work as it should. You can use the list below to help you make choices about which foods to eat. Fruits · Fresh, frozen, canned, or dried fruit Vegetables · Fresh or frozen vegetables, with no added salt · Canned vegetables, low-sodium or with no added salt Grains · Bagels without salted tops · Cereal with no added salt · Corn tortillas · Crackers with no added salt · Oatmeal, cooked without salt · Popcorn with no salt · Pasta and noodles, cooked without salt · Rice, cooked without salt · Unsalted pretzels Dairy and dairy alternatives · Butter, unsalted · Cream cheese · Ice cream 
· Milk · Soy milk Meats and other protein foods · Beans and peas, canned with no salt · Eggs · Fresh fish (not smoked) · Fresh meats (not smoked or cured) · Nuts and nut butter, prepared without salt · Poultry, not packaged with sodium solution · Tofu, unseasoned · Tuna, canned without salt Seasonings · Garlic · Herbs and spices · Lemon juice · Mustard · Olive oil · Salt-free seasoning mixes · Vinegar Work with your doctor to find out how much of this nutrient you need. Depending on your health, you may need more or less of it in your diet. Where can you learn more? Go to http://www.gray.com/ Enter V524 in the search box to learn more about \"Learning About Low-Sodium Foods. \" Current as of: August 22, 2019               Content Version: 12.6 © 5417-7696 [employer]. Care instructions adapted under license by [employer] (which disclaims liability or warranty for this information). If you have questions about a medical condition or this instruction, always ask your healthcare professional. [place] [address] any warranty or liability for your use of this information. How to Read a Food Label to Limit Sodium: Care Instructions Your Care Instructions Sodium causes your body to hold on to extra water. This can raise your blood pressure and force your heart and kidneys to work harder. In very serious cases, this could cause you to be put in the hospital. It might even be life-threatening. By limiting sodium, you will feel better and lower your risk of serious problems. Processed foods, fast food, and restaurant foods are the major sources of dietary sodium. The most common name for sodium is salt. Try to limit how much sodium you eat to less than 2,300 milligrams (mg) a day. If you limit your sodium to 1,500 mg a day, you can lower your blood pressure even more. This limit counts all the salt that you eat in foods you cook or in packaged foods. Keep a list of everything you eat and drink. Follow-up care is a key part of your treatment and safety. Be sure to make and go to all appointments, and call your doctor if you are having problems. It's also a good idea to know your test results and keep a list of the medicines you take. How can you care for yourself at home? Read ingredient lists on food labels · Read the list of ingredients on food labels to help you find how much sodium is in a food. The label lists the ingredients in a food in descending order (from the most to the least). If salt or sodium is high on the list, there may be a lot of sodium in the food. · Know that sodium has different names. Sodium is also called monosodium glutamate (MSG, common in [facility] food), sodium citrate, sodium alginate, sodium hydroxide, and sodium phosphate. Read Nutrition Facts labels · On most foods, there is a Nutrition Facts label. This will tell you how much sodium is in one serving of food. Look at both the serving size and the sodium amount. The serving size is located at the top of the label, usually right under the \"Nutrition Facts\" title. The amount of sodium is given in the list under the title. It is given in milligrams (mg). ? Check the serving size carefully. A single serving is often very small, and you may eat more than one serving. If this is the case, you will eat more sodium than listed on the label. For example, if the serving size for a canned soup is 1 cup and the sodium amount is 470 mg, if you have 2 cups you will eat 940 mg of sodium. · The nutrition facts for fresh fruits and vegetables are not listed on the food. They may be listed somewhere in the store. These foods usually have no sodium or low sodium. · The Nutrition Facts label also gives you the Percent Daily Value for sodium. This is how much of the recommended amount of sodium a serving contains. The daily value for sodium is less than 2,300 mg. So if the Percent Daily Value says 50%, this means one serving is giving you half of this, or 1,150 mg. Buy low-sodium foods · Look for foods that are made with less sodium. Watch for the following words on the label. ? \"Unsalted\" means there is no sodium added to the food. But there may be sodium already in the food naturally. ? \"Sodium-free\" means a serving has less than 5 mg of sodium. ? \"Very low sodium\" means a serving has 35 mg or less of sodium. ? \"Low-sodium\" means a serving has 140 mg or less of sodium. · \"Reduced-sodium\" means that there is 25% less sodium than what the food normally has. This is still usually too much sodium. Try not to buy foods with this on the label. · Buy fresh vegetables, or frozen vegetables without added sauces. Buy low-sodium versions of canned vegetables, soups, and other canned goods. Where can you learn more? Go to http://www.gray.com/ Enter 26 [id number] in the search box to learn more about \"How to Read a Food Label to Limit Sodium: Care Instructions. \" Current as of: August 22, 2019               Content Version: 12.6 © 0157-4527 [employer]. Care instructions adapted under license by [employer] (which disclaims liability or warranty for this information). If you have questions about a medical condition or this instruction, always ask your healthcare professional. [place] [address] any warranty or liability for your use of this information. Counting Carbohydrates: Care Instructions Your Care Instructions You don't have to eat special foods when you have diabetes. You just have to be careful to eat healthy foods. Carbohydrates (carbs) raise blood sugar higher and quicker than any other nutrient. Carbs are found in desserts, breads and cereals, and fruit. They're also in starchy vegetables. These include potatoes, corn, and grains such as rice and pasta. Carbs are also in milk and yogurt. The more carbs you eat at one time, the higher your blood sugar will rise. Spreading carbs all through the day helps keep your blood sugar levels within your target range. Counting carbs is one of the best ways to keep your blood sugar under control. If you use insulin, counting carbs helps you match the right amount of insulin to the number of grams of carbs in a meal. Then you can change your diet and insulin dose as needed. Testing your blood sugar several times a day can help you learn how carbs affect your blood sugar. A registered dietitian or certified diabetes educator can help you plan meals and snacks. Follow-up care is a key part of your treatment and safety. Be sure to make and go to all appointments, and call your doctor if you are having problems. It's also a good idea to know your test results and keep a list of the medicines you take. How can you care for yourself at home? Know your daily amount of carbohydrates Your daily amount depends on several things, such as your weight, how active you are, which diabetes medicines you take, and what your goals are for your blood sugar levels. A registered dietitian or certified diabetes educator can help you plan how many carbs to include in each meal and snack. For most adults, a guideline for the daily amount of carbs is: · 45 to 60 grams at each meal. That's about the same as 3 to 4 carbohydrate servings. · 15 to 20 grams at each snack. That's about the same as 1 carbohydrate serving. Count carbs Counting carbs lets you know how much rapid-acting insulin to take before you eat. If you use an insulin pump, you get a constant rate of insulin during the day. So the pump must be programmed at meals. This gives you extra insulin to cover the rise in blood sugar after meals. If you take insulin: 
· Learn your own insulin-to-carb ratio. You and your diabetes health professional will figure out the ratio. You can do this by testing your blood sugar after meals. For example, you may need a certain amount of insulin for every 15 grams of carbs. · Add up the carb grams in a meal. Then you can figure out how many units of insulin to take based on your insulin-to-carb ratio. · Exercise lowers blood sugar. You can use less insulin than you would if you were not doing exercise. Keep in mind that timing matters. If you exercise within 1 hour after a meal, your body may need less insulin for that meal than it would if you exercised 3 hours after the meal. Test your blood sugar to find out how exercise affects your need for insulin. If you do or don't take insulin: 
· Look at labels on packaged foods. This can tell you how many carbs are in a serving. You can also use guides from the American Diabetes Association. · Be aware of portions, or serving sizes. If a package has two servings and you eat the whole package, you need to double the number of grams of carbohydrate listed for one serving. · Protein, fat, and fiber do not raise blood sugar as much as carbs do. If you eat a lot of these nutrients in a meal, your blood sugar will rise more slowly than it would otherwise. Eat from all food groups · Eat at least three meals a day. · Plan meals to include food from all the food groups. The food groups include grains, fruits, dairy, proteins, and vegetables. · Talk to your dietitian or diabetes educator about ways to add limited amounts of sweets into your meal plan. · If you drink alcohol, talk to your doctor. It may not be recommended when you are taking certain diabetes medicines. Where can you learn more? Go to http://www.gray.com/ Enter L562 in the search box to learn more about \"Counting Carbohydrates: Care Instructions. \" Current as of: December 20, 2019               Content Version: 12.6 © 9009-8502 [employer]. Care instructions adapted under license by [employer] (which disclaims liability or warranty for this information). If you have questions about a medical condition or this instruction, always ask your healthcare professional. [address] any warranty or liability for your use of this information.

## 2021-02-01 NOTE — PROGRESS NOTES
Alfreda Joyce is a 67 y.o. female (: 1945) presenting to address:    Chief Complaint   Patient presents with    Medication Evaluation       Vitals:    21 1332 21 1414 21 1415   BP: (!) 154/73 130/87 (!) 160/82   Pulse: 76     Resp: 14     Temp: 97.8 °F (36.6 °C)     TempSrc: Temporal     SpO2: 100%     Weight: 145 lb 9.6 oz (66 kg)     Height: 5' 3\" (1.6 m)     PainSc:   0 - No pain         Is someone accompanying this pt? NO    Is the patient using any DME equipment during OV? NO    Hearing/Vision:   No exam data present    Learning Assessment:     Learning Assessment 2015   PRIMARY LEARNER Patient   HIGHEST LEVEL OF EDUCATION - PRIMARY LEARNER  SOME COLLEGE   BARRIERS PRIMARY LEARNER NONE   CO-LEARNER CAREGIVER No   PRIMARY LANGUAGE ENGLISH   LEARNER PREFERENCE PRIMARY VIDEOS   ANSWERED BY self   RELATIONSHIP SELF     Depression Screening:     3 most recent PHQ Screens 2021   Little interest or pleasure in doing things Not at all   Feeling down, depressed, irritable, or hopeless Not at all   Total Score PHQ 2 0     Fall Risk Assessment:     Fall Risk Assessment, last 12 mths 2021   Able to walk? Yes   Fall in past 12 months? 0   Do you feel unsteady? 0   Are you worried about falling 0   Number of falls in past 12 months -   Fall with injury? -     Coordination of Care Questionaire:   1. Have you been to the ER, urgent care clinic since your last visit? Hospitalized since your last visit? [facility]     2. Have you seen or consulted any other health care providers outside of the [address] [place] [address] since your last visit? Include any pap smears or colon screening. NO    Advanced Directive:   1. Do you have an Advanced Directive? YES    2. Would you like information on Advanced Directives?  NO

## 2021-02-01 NOTE — PROGRESS NOTES
Nestor Miranda     Chief Complaint   Patient presents with    Medication Evaluation     Vitals:    21 1332 21 1414 21 1415   BP: (!) 154/73 130/87 (!) 160/82   Pulse: 76     Resp: 14     Temp: 97.8 °F (36.6 °C)     TempSrc: Temporal     SpO2: 100%     Weight: 145 lb 9.6 oz (66 kg)     Height: 5' 3\" (1.6 m)     PainSc:   0 - No pain           HPI: Patient is here for follow-up and to establish care with a new PCP recently got diagnosed with diabetes  And she was started on Metformin patient stop Metformin due to side effects, mostly dizziness and nausea, her dizziness slightly improved after stopping Metformin but she still feels dizzy especially with changing position she had made lifestyle modification changes in her eating habits    She stopped Metformin 1 week ago fasting blood sugar  126 ,128     She does exercise regularly water aerobics 4 times a week. Patient has been having dizziness and lightheadedness no chest pain no shortness of breath no blurred vision she has no vertigo    Past Medical History:   Diagnosis Date    Diabetes ([address]     Hypercholesterolemia     Hypertension      Past Surgical History:   Procedure Laterality Date    HX COLONOSCOPY      HX GI  2017    hernia surgery     Social History     Tobacco Use    Smoking status: Former Smoker     Packs/day: 0.01     Years: 3.00     Pack years: 0.03     Quit date: 1970     Years since quittin.1    Smokeless tobacco: Never Used   Substance Use Topics    Alcohol use: No     Alcohol/week: 0.0 standard drinks       Family History   Problem Relation Age of Onset    Heart Disease Mother         pacemaker    Stroke Father     Heart Disease Brother        Review of Systems   Constitutional: Negative for chills, fever, malaise/fatigue and weight loss. HENT: Negative for congestion, ear discharge, ear pain, hearing loss, nosebleeds, sinus pain and sore throat.     Eyes: Negative for blurred vision, double vision and discharge. Respiratory: Negative for cough, hemoptysis, sputum production, shortness of breath and wheezing. Cardiovascular: Negative for chest pain, palpitations, claudication and leg swelling. Gastrointestinal: Negative for abdominal pain, constipation, diarrhea, nausea and vomiting. Genitourinary: Negative for dysuria, frequency and urgency. Musculoskeletal: Negative for back pain, falls, joint pain, myalgias and neck pain. Skin: Negative for itching and rash. Neurological: Positive for dizziness. Negative for tingling, sensory change, speech change, focal weakness, weakness and headaches. Psychiatric/Behavioral: Negative for depression, hallucinations, substance abuse and suicidal ideas. The patient is not nervous/anxious and does not have insomnia. Physical Exam  Vitals signs and nursing note reviewed. Constitutional:       General: She is not in acute distress. Appearance: She is well-developed. She is not diaphoretic. HENT:      Head: Normocephalic and atraumatic. Eyes:      General: No scleral icterus. Right eye: No discharge. Left eye: No discharge. Conjunctiva/sclera: Conjunctivae normal.      Pupils: Pupils are equal, round, and reactive to light. Neck:      Thyroid: No thyromegaly. Cardiovascular:      Rate and Rhythm: Normal rate and regular rhythm. Heart sounds: Normal heart sounds. No murmur. Pulmonary:      Effort: Pulmonary effort is normal. No respiratory distress. Breath sounds: Normal breath sounds. No wheezing or rales. Chest:      Chest wall: No tenderness. Abdominal:      General: There is no distension. Palpations: Abdomen is soft. Tenderness: There is no abdominal tenderness. There is no rebound. Musculoskeletal: Normal range of motion. General: No tenderness or deformity. Lymphadenopathy:      Cervical: No cervical adenopathy. Skin:     General: Skin is warm and dry.       Coloration: Skin is not pale. Findings: No erythema or rash. Neurological:      Mental Status: She is alert and oriented to person, place, and time. Cranial Nerves: No cranial nerve deficit. Coordination: Coordination normal.   Psychiatric:         Behavior: Behavior normal.         Thought Content: Thought content normal.         Judgment: Judgment normal.          Assessment and plan     Plan of care has been discussed with the patient, he agrees to the plan and verbalized understanding. All his questions were answered  More than 50% of the time spent in this visit was counseling the patient about  illness and treatment options         1. Type 2 diabetes mellitus with stage 3 chronic kidney disease, without long-term current use of insulin ([facility] [[email or web address]  She will follow-up to repeat hemoglobin A1c in March patient would like to try lifestyle modification  - glucose blood VI test strips (True Metrix Glucose Test Strip) strip; 200 Each by Does Not Apply route See Admin Instructions. Dispense: 200 Strip; Refill: 3  - lancets misc; Test bs daily. E11.22  Dispense: 100 Each; Refill: 11    2. Essential hypertension  There is no orthostatic hypotension    Patient has just widened pulse pressure she does not have anemia    No history of thyroid disease    We will get 2D echo to assess aortic regurgitation or aortic stenosis  - AMB POC EKG ROUTINE W/ 12 LEADS, INTER & REP    3. Dizziness  Advised patient to stay well-hydrated    - AMB POC EKG ROUTINE W/ 12 LEADS, INTER & REP    Current Outpatient Medications   Medication Sig Dispense Refill    glucose blood VI test strips (True Metrix Glucose Test Strip) strip 200 Each by Does Not Apply route See Admin Instructions. 200 Strip 3    lancets misc Test bs daily. E11.22 100 Each 11    losartan (COZAAR) 100 mg tablet Take 1 tablet by mouth once daily 90 Tab 3    simvastatin (ZOCOR) 20 mg tablet Take 1 Tab by mouth nightly.  90 Tab 3    hydroCHLOROthiazide (HYDRODIURIL) 25 mg tablet TAKE 1 TABLET BY MOUTH ONCE DAILY 90 Tab 3    BIOTIN PO Take  by mouth.  cholecalciferol, vitamin D3, 2,000 unit Tab Take  by mouth daily. Patient Active Problem List    Diagnosis Date Noted    23-polyvalent pneumococcal polysaccharide vaccine declined 03/20/2018    DNR (do not resuscitate) 98/38/8413    Umbilical hernia 09/42/0462    Neural foraminal stenosis of lumbar spine 12/11/2015    CKD (chronic kidney disease) stage 3, GFR 30-59 ml/min 08/09/2013    HTN (hypertension) 12/11/2012    HLD (hyperlipidemia) 12/11/2012    DMII (diabetes mellitus, type 2) ([facility] [facility] 12/11/2012    Vitamin D deficiency 12/11/2012    Osteoarthritis of neck 12/11/2012     Results for orders placed or performed in visit on 12/15/20   MICROALBUMIN, UR, RAND W/ MICROALB/CREAT RATIO   Result Value Ref Range    Creatinine, urine CANCELED mg/dL   HEMOGLOBIN A1C WITH EAG   Result Value Ref Range    Hemoglobin A1c 7.8 (H) 4.8 - 5.6 %    Estimated average glucose 177 mg/dL   LIPID PANEL   Result Value Ref Range    Cholesterol, total 133 100 - 199 mg/dL    Triglyceride 96 0 - 149 mg/dL    HDL Cholesterol 48 >39 mg/dL    VLDL, calculated 18 5 - 40 mg/dL    LDL, calculated 67 0 - 99 mg/dL   METABOLIC PANEL, COMPREHENSIVE   Result Value Ref Range    Glucose 160 (H) 65 - 99 mg/dL    BUN 30 (H) 8 - 27 mg/dL    Creatinine 1.07 (H) 0.57 - 1.00 mg/dL    GFR est non-AA 51 (L) >59 mL/min/1.73    GFR est AA 58 (L) >59 mL/min/1.73    BUN/Creatinine ratio 28 12 - 28    Sodium 140 134 - 144 mmol/L    Potassium 4.1 3.5 - 5.2 mmol/L    Chloride 102 96 - 106 mmol/L    CO2 24 20 - 29 mmol/L    Calcium 9.3 8.7 - 10.3 mg/dL    Protein, total 6.3 6.0 - 8.5 g/dL    Albumin 4.3 3.7 - 4.7 g/dL    GLOBULIN, TOTAL 2.0 1.5 - 4.5 g/dL    A-G Ratio 2.2 1.2 - 2.2    Bilirubin, total 0.8 0.0 - 1.2 mg/dL    Alk.  phosphatase 79 39 - 117 IU/L    AST (SGOT) 13 0 - 40 IU/L    ALT (SGPT) 23 0 - 32 IU/L   CBC W/O DIFF   Result Value Ref Range    WBC 6.7 3.4 - 10.8 x10E3/uL    RBC 4.48 3.77 - 5.28 x10E6/uL    HGB 12.7 11.1 - 15.9 g/dL    HCT 39.1 34.0 - 46.6 %    MCV 87 79 - 97 fL    MCH 28.3 26.6 - 33.0 pg    MCHC 32.5 31.5 - 35.7 g/dL    RDW 12.2 11.7 - 15.4 %    PLATELET 491 983 - 596 x10E3/uL   CVD REPORT   Result Value Ref Range    INTERPRETATION Note     PDF IMAGE Not applicable    CKD REPORT   Result Value Ref Range    Interpretation Note    DIABETES PATIENT EDUCATION   Result Value Ref Range    PDF Image Not applicable    METABOLIC PANEL, COMPREHENSIVE   Result Value Ref Range    Glucose CANCELED mg/dL    BUN CANCELED     Creatinine CANCELED     Sodium CANCELED     Potassium CANCELED     Chloride CANCELED     CO2 CANCELED     Calcium CANCELED     Protein, total CANCELED     Albumin CANCELED     Bilirubin, total CANCELED     Alk. phosphatase CANCELED     AST (SGOT) CANCELED     ALT (SGPT) CANCELED    CBC W/O DIFF   Result Value Ref Range    WBC CANCELED x10E3/uL    RBC CANCELED     HGB CANCELED     HCT CANCELED     PLATELET CANCELED     NRBC CANCELED %   LIPID PANEL   Result Value Ref Range    Cholesterol, total CANCELED mg/dL    Triglyceride CANCELED     HDL Cholesterol CANCELED     VLDL, calculated CANCELED mg/dL   MICROALBUMIN, UR, RAND W/ MICROALB/CREAT RATIO   Result Value Ref Range    Creatinine, urine 101.4 Not Estab. mg/dL    Microalbumin, urine 17.2 Not Estab. ug/mL    Microalb/Creat ratio (ug/mg creat.) 17 0 - 29 mg/g creat   CVD REPORT   Result Value Ref Range    INTERPRETATION Note     PDF IMAGE Not applicable    HEMOGLOBIN A1C WITH EAG   Result Value Ref Range    Hemoglobin A1c CANCELED %   DIABETES PATIENT EDUCATION   Result Value Ref Range    PDF Image Not applicable      Appointment on 12/15/2020   Component Date Value Ref Range Status    Creatinine, urine 12/15/2020 CANCELED  mg/dL Final-Edited    Comment: Please refer to the following specimen for additional lab results. [phone]    Result canceled by the ancillary.       Hemoglobin A1c 12/15/2020 7.8* 4.8 - 5.6 % Final    Comment:          Prediabetes: 5.7 - 6.4           Diabetes: >6.4           Glycemic control for adults with diabetes: <7.0      Estimated average glucose 12/15/2020 177  mg/dL Final    Cholesterol, total 12/15/2020 133  100 - 199 mg/dL Final    Triglyceride 12/15/2020 96  0 - 149 mg/dL Final    HDL Cholesterol 12/15/2020 48  >39 mg/dL Final    VLDL, calculated 12/15/2020 18  5 - 40 mg/dL Final    LDL, calculated 12/15/2020 67  0 - 99 mg/dL Final    Glucose 12/15/2020 160* 65 - 99 mg/dL Final    BUN 12/15/2020 30* 8 - 27 mg/dL Final    Creatinine 12/15/2020 1.07* 0.57 - 1.00 mg/dL Final    GFR est non-AA 12/15/2020 51* >59 mL/min/1.73 Final    GFR est AA 12/15/2020 58* >59 mL/min/1.73 Final    BUN/Creatinine ratio 12/15/2020 28  12 - 28 Final    Sodium 12/15/2020 140  134 - 144 mmol/L Final    Potassium 12/15/2020 4.1  3.5 - 5.2 mmol/L Final    Chloride 12/15/2020 102  96 - 106 mmol/L Final    CO2 12/15/2020 24  20 - 29 mmol/L Final    Calcium 12/15/2020 9.3  8.7 - 10.3 mg/dL Final    Protein, total 12/15/2020 6.3  6.0 - 8.5 g/dL Final    Albumin 12/15/2020 4.3  3.7 - 4.7 g/dL Final    GLOBULIN, TOTAL 12/15/2020 2.0  1.5 - 4.5 g/dL Final    A-G Ratio 12/15/2020 2.2  1.2 - 2.2 Final    Bilirubin, total 12/15/2020 0.8  0.0 - 1.2 mg/dL Final    Alk.  phosphatase 12/15/2020 79  39 - 117 IU/L Final    AST (SGOT) 12/15/2020 13  0 - 40 IU/L Final    ALT (SGPT) 12/15/2020 23  0 - 32 IU/L Final    WBC 12/15/2020 6.7  3.4 - 10.8 x10E3/uL Final    RBC 12/15/2020 4.48  3.77 - 5.28 x10E6/uL Final    HGB 12/15/2020 12.7  11.1 - 15.9 g/dL Final    HCT 12/15/2020 39.1  34.0 - 46.6 % Final    MCV 12/15/2020 87  79 - 97 fL Final    MCH 12/15/2020 28.3  26.6 - 33.0 pg Final    MCHC 12/15/2020 32.5  31.5 - 35.7 g/dL Final    RDW 12/15/2020 12.2  11.7 - 15.4 % Final    PLATELET 26/54/1264 896  150 - 450 x10E3/uL Final    INTERPRETATION 12/15/2020 Note   Final Supplemental report is available.  PDF IMAGE 77/10/5497 Not applicable   Final    Interpretation 12/15/2020 Note   Corrected    Comment: Medical Director's Note: A duplicate report has been  generated on 12/16/2020 due to demographic update(s) on one  of several items, including patient ID, fasting status,  patient age, specimen collection date/time, patient  height/weight, or total urine volume. Please review this  interpretive report in its entirety as changes may have  occurred in the Analysis/Assessment and/or Treatment/Follow  Up sections. Medical Director's Note: Two or more accessions have been  associated with this patient's current order. The listing of  laboratory test results is not included within this  interpretive report. Please refer to the [facility] report for  these test results. Medical Director's Note: This is a amended report on  12/16/2020. Specimen results derived from an additional  sample with the same date of service were received after the  previous report was issued. Where tests were duplicated, the  results appearing in the previous report were used in the  preparation of this updated report                            with that value appearing  in appropriate panels from both samples. When available,  Treatment and/or Follow-Up suggestions in the Bone and  Mineral section are based on concomitant calcium/intact PTH  results. You should consult the [facility] results report to  verify that the duplicated tests are clinically significant. Please contact us if you need additional information and  review this report in its entirety as changes may have  occurred in the Analysis and Treatments and/or Follow-Up  Suggestions sections. Supplemental report is available.  PDF Image 90/07/7837 Not applicable   Final    Glucose 12/15/2020 CANCELED  mg/dL Final-Edited    Comment: Please refer to the following specimen for additional lab results.   [phone]    Result canceled by the ancillary.  BUN 12/15/2020 CANCELED   Final-Edited    Comment: Test not performed    Result canceled by the ancillary.  Creatinine 12/15/2020 CANCELED   Final-Edited    Comment: Test not performed    Result canceled by the ancillary.  Sodium 12/15/2020 CANCELED   Final-Edited    Comment: Test not performed    Result canceled by the ancillary.  Potassium 12/15/2020 CANCELED   Final-Edited    Comment: Test not performed    Result canceled by the ancillary.  Chloride 12/15/2020 CANCELED   Final-Edited    Comment: Test not performed    Result canceled by the ancillary.  CO2 12/15/2020 CANCELED   Final-Edited    Comment: Test not performed    Result canceled by the ancillary.  Calcium 12/15/2020 CANCELED   Final-Edited    Comment: Test not performed    Result canceled by the ancillary.  Protein, total 12/15/2020 CANCELED   Final-Edited    Comment: Test not performed    Result canceled by the ancillary.  Albumin 12/15/2020 CANCELED   Final-Edited    Comment: Test not performed    Result canceled by the ancillary.  Bilirubin, total 12/15/2020 CANCELED   Final-Edited    Comment: Test not performed    Result canceled by the ancillary.  Alk. phosphatase 12/15/2020 CANCELED   Final-Edited    Comment: Test not performed    Result canceled by the ancillary.  AST (SGOT) 12/15/2020 CANCELED   Final-Edited    Comment: Test not performed    Result canceled by the ancillary.  ALT (SGPT) 12/15/2020 CANCELED   Final-Edited    Comment: Test not performed    Result canceled by the ancillary.  WBC 12/15/2020 CANCELED  x10E3/uL Final-Edited    Comment: Please refer to the following specimen for additional lab results. [phone]    Result canceled by the ancillary.  RBC 12/15/2020 CANCELED   Final-Edited    Comment: Test not performed    Result canceled by the ancillary.       HGB 12/15/2020 CANCELED   Final-Edited    Comment: Test not performed    Result canceled by the ancillary.  HCT 12/15/2020 CANCELED   Final-Edited    Comment: Test not performed    Result canceled by the ancillary.  PLATELET 34/05/2936 CANCELED   Final-Edited    Comment: Test not performed    Result canceled by the ancillary.  NRBC 12/15/2020 CANCELED  % Final-Edited    Comment: Please refer to the following specimen for additional lab results. [phone]    Result canceled by the ancillary.  Cholesterol, total 12/15/2020 CANCELED  mg/dL Final-Edited    Comment: Please refer to the following specimen for additional lab results. [phone]    Result canceled by the ancillary.  Triglyceride 12/15/2020 CANCELED   Final-Edited    Comment: Test not performed    Result canceled by the ancillary.  HDL Cholesterol 12/15/2020 CANCELED   Final-Edited    Comment: Test not performed    Result canceled by the ancillary.  VLDL, calculated 12/15/2020 CANCELED  mg/dL Final-Edited    Comment: Unable to calculate result since non-numeric result obtained for  component test.    Result canceled by the ancillary.  Creatinine, urine 12/15/2020 101.4  Not Estab. mg/dL Final    Microalbumin, urine 12/15/2020 17.2  Not Estab. ug/mL Final    Microalb/Creat ratio (ug/mg creat.) 12/15/2020 17  0 - 29 mg/g creat Final    Comment:                        Normal:                0 -  29                         Moderately increased: 30 - 300                         Severely increased:       >300      INTERPRETATION 12/15/2020 Note   Final    Comment: Medical Director's Note: A duplicate report has been  generated on 12/16/2020 due to demographic update(s) on one  of several items, including patient ID, fasting status,  patient age, specimen collection date/time, patient  height/weight, or total urine volume. Please review this  interpretive report in its entirety as changes may have  occurred in the Analysis/Assessment and/or Treatment/Follow  Up sections.   Medical Director's Note: Two or more accessions have been  associated with this patient's current order. The listing of  laboratory test results is not included within this  interpretive report. Please refer to the [facility] report for  these test results. Medical Director's Note: This is a amended report on  12/16/2020. Specimen results derived from an additional  sample with the same date of service were received after the  previous report was issued. Where tests were duplicated, the  results appearing in the previous report were used in the  preparation of this updated report                            with that value appearing  in appropriate panels from both samples. When available,  Treatment and/or Follow-Up suggestions in the Bone and  Mineral section are based on concomitant calcium/intact PTH  results. You should consult the [facility] results report to  verify that the duplicated tests are clinically significant. Please contact us if you need additional information and  review this report in its entirety as changes may have  occurred in the Analysis and Treatments and/or Follow-Up  Suggestions sections. Supplemental report is available.  PDF IMAGE 32/11/9834 Not applicable   Final    Hemoglobin A1c 12/15/2020 CANCELED  % Final-Edited    Comment: Please refer to the following specimen for additional lab results. [phone]           Prediabetes: 5.7 - 6.4           Diabetes: >6.4           Glycemic control for adults with diabetes: <7.0    Result canceled by the ancillary.  PDF Image 66/88/0172 Not applicable   Final          Follow-up and Dispositions    · Return in about 2 months (around 4/1/2021).

## 2021-02-02 NOTE — TELEPHONE ENCOUNTER
Patient seemed a little upset, but she stated that she is not going to take care of this right now. She did not specify why she is not wanting to get it done.

## 2021-02-02 NOTE — TELEPHONE ENCOUNTER
Please call patient let her know that I order echocardiogram, to make sure she has no valve disease that is causing her dizziness  and see where she went to have it done ? ?

## 2021-02-04 ENCOUNTER — TELEPHONE (OUTPATIENT)
Dept: FAMILY MEDICINE CLINIC | Age: 76
End: 2021-02-04

## 2021-02-04 DIAGNOSIS — N18.30 TYPE 2 DIABETES MELLITUS WITH STAGE 3 CHRONIC KIDNEY DISEASE, WITHOUT LONG-TERM CURRENT USE OF INSULIN (HCC): ICD-10-CM

## 2021-02-04 DIAGNOSIS — E11.22 TYPE 2 DIABETES MELLITUS WITH STAGE 3 CHRONIC KIDNEY DISEASE, WITHOUT LONG-TERM CURRENT USE OF INSULIN (HCC): ICD-10-CM

## 2021-02-04 NOTE — TELEPHONE ENCOUNTER
Pharmacist from [address] called stating that the lancets and test strips that were sent in need to be resent with a frequency in the directions. I asked the pharmacist if he was able to take a verbal, pharmacist states that he is not allowed to modify the prescription due to the coverage being medicare pt B. Please resend new prescriptions.

## 2021-02-05 RX ORDER — LANCETS
EACH MISCELLANEOUS
Qty: 100 EACH | Refills: 11 | Status: SHIPPED | OUTPATIENT
Start: 2021-02-05

## 2021-02-05 RX ORDER — CALCIUM CITRATE/VITAMIN D3 200MG-6.25
200 TABLET ORAL SEE ADMIN INSTRUCTIONS
Qty: 200 STRIP | Refills: 3 | Status: SHIPPED | OUTPATIENT
Start: 2021-02-05

## 2022-03-19 PROBLEM — Z66 DNR (DO NOT RESUSCITATE): Status: ACTIVE | Noted: 2018-03-20

## 2022-03-19 PROBLEM — Z28.21 23-POLYVALENT PNEUMOCOCCAL POLYSACCHARIDE VACCINE DECLINED: Status: ACTIVE | Noted: 2018-03-20

## 2023-07-10 ENCOUNTER — HOSPITAL ENCOUNTER (OUTPATIENT)
Facility: HOSPITAL | Age: 78
Setting detail: RECURRING SERIES
Discharge: HOME | End: 2023-07-13
Payer: MEDICARE

## 2023-07-10 PROCEDURE — 97162 PT EVAL MOD COMPLEX 30 MIN: CPT

## 2023-07-10 NOTE — THERAPY EVALUATION
Goals: To be accomplished in 4 weeks  Independent with HEP to progress to meet goals. 2. Pt to report decreased max pain levels less than 5/10 for improvement in ADLs. Long Term Goals: To be accomplished in 8 weeks  Improve FOTO score to 65/100 to show a significant functional change. 2. Pt to report decreased max pain levels less than 3/10 for improvement in QoL. 3. Pt to report sitting in car for 1 hour without onset of neck pain to improve community integration. Frequency / Duration: Patient to be seen 2 times per week for 8 weeks    Patient/ Caregiver education and instruction: Diagnosis, prognosis, self care, activity modification, and exercises [x]  Plan of care has been reviewed with PTA    Certification Period: 7/10/23 - 10/8/23    Krissy Page, PT       7/10/2023       8:50 AM    Payor: Tristan Sheppard / Plan: [address] [place] [address] HMO / Product Type: *No Product type* /     Physician signature required for Medicare, Medicaid, Worker's Comp, Direct Access   ===================================================================  I certify that the above Therapy Services are being furnished while the patient is under my care. I agree with the treatment plan and certify that this therapy is necessary. Physician's Signature:_________________________   DATE:_________   TIME:________                           Ana Quinones MD    ** Signature, Date and Time must be completed for valid certification **  Please sign and fax to [facility].  Thank you

## 2023-07-10 NOTE — PROGRESS NOTES
PHYSICAL / OCCUPATIONAL THERAPY - DAILY TREATMENT NOTE (updated )  For Eval visit    Patient Name: Yaakov Kirby    Date: 7/10/2023    : 1945  Insurance: Payor: Silvestre Uriostegui / Plan: [employer]DE[employer] HMO / Product Type: *No Product type* /      Patient  verified yes     Visit #   Current / Total 1 16   Time   In / Out 2:20 2:55   Pain   In / Out 0 0   Subjective Functional Status/Changes: See POC     TREATMENT AREA =  Cervicalgia [M54.2]    OBJECTIVE           35 min   Eval - untimed                      Therapeutic Procedures: Tx Min Billable or 1:1 Min (if diff from Tx Min) Procedure, Rationale, Specifics     69175 Therapeutic Exercise (timed):  increase ROM, strength, coordination, balance, and proprioception to improve patient's ability to progress to PLOF and address remaining functional goals. (see flow sheet as applicable)     Details if applicable:       84102 Neuromuscular Re-Education (timed):  improve balance, coordination, kinesthetic sense, posture, core stability and proprioception to improve patient's ability to develop conscious control of individual muscles and awareness of position of extremities in order to progress to PLOF and address remaining functional goals. (see flow sheet as applicable)     Details if applicable:       13432 Manual Therapy (timed):  decrease pain, increase ROM, increase tissue extensibility, and decrease trigger points to improve patient's ability to progress to PLOF and address remaining functional goals. The manual therapy interventions were performed at a separate and distinct time from the therapeutic activities interventions .  (see flow sheet as applicable)     Details if applicable:       25488 Self Care/Home Management (timed):  improve patient knowledge and understanding of pain reducing techniques, positioning, posture/ergonomics, home safety, activity modification, diagnosis/prognosis, and physical therapy expectations, procedures and

## 2023-07-20 ENCOUNTER — HOSPITAL ENCOUNTER (OUTPATIENT)
Facility: HOSPITAL | Age: 78
Setting detail: RECURRING SERIES
Discharge: HOME | End: 2023-07-23
Payer: MEDICARE

## 2023-07-20 PROCEDURE — 97110 THERAPEUTIC EXERCISES: CPT

## 2023-07-20 PROCEDURE — 97140 MANUAL THERAPY 1/> REGIONS: CPT

## 2023-07-20 NOTE — PROGRESS NOTES
PHYSICAL / OCCUPATIONAL THERAPY - DAILY TREATMENT NOTE (updated )    Patient Name: Johnna Davis    Date: 2023    : 1945  Insurance: Payor: Nikhil Abbiemma / Plan: [employer] HMO / Product Type: *No Product type* /      Patient  verified yes     Visit #   Current / Total 2 16   Time   In / Out 4:21 5:00   Pain   In / Out 6/10 1   Subjective Functional Status/Changes: Says she has been having a lot of pain with the neck ever since IE. Feels like the L side is pinched. TREATMENT AREA =  Cervicalgia [M54.2]    OBJECTIVE    Therapeutic Procedures: Tx Min Billable or 1:1 Min (if diff from Tx Min) Procedure, Rationale, Specifics   14  24960 Therapeutic Exercise (timed):  increase ROM, strength, coordination, balance, and proprioception to improve patient's ability to progress to PLOF and address remaining functional goals. (see flow sheet as applicable)     Details if applicable:       63373 Neuromuscular Re-Education (timed):  improve balance, coordination, kinesthetic sense, posture, core stability and proprioception to improve patient's ability to develop conscious control of individual muscles and awareness of position of extremities in order to progress to PLOF and address remaining functional goals. (see flow sheet as applicable)     Details if applicable:     25  64927 Manual Therapy (timed):  decrease pain, increase ROM, increase tissue extensibility, and decrease trigger points to improve patient's ability to progress to PLOF and address remaining functional goals. The manual therapy interventions were performed at a separate and distinct time from the therapeutic activities interventions .  (see flow sheet as applicable)     Details if applicable:  STM along bilat UT and levator, SOR, cervical distraction, STM to L scalenes     71055 Self Care/Home Management (timed):  improve patient knowledge and understanding of pain reducing techniques, positioning, posture/ergonomics, home

## 2023-07-27 ENCOUNTER — APPOINTMENT (OUTPATIENT)
Facility: HOSPITAL | Age: 78
End: 2023-07-27
Payer: MEDICARE

## 2023-07-31 ENCOUNTER — APPOINTMENT (OUTPATIENT)
Facility: HOSPITAL | Age: 78
End: 2023-07-31
Payer: MEDICARE

## 2023-08-02 ENCOUNTER — HOSPITAL ENCOUNTER (OUTPATIENT)
Facility: HOSPITAL | Age: 78
Setting detail: RECURRING SERIES
Discharge: HOME | End: 2023-08-05
Payer: MEDICARE

## 2023-08-02 PROCEDURE — 97110 THERAPEUTIC EXERCISES: CPT

## 2023-08-02 PROCEDURE — 97140 MANUAL THERAPY 1/> REGIONS: CPT

## 2023-08-02 NOTE — PROGRESS NOTES
PHYSICAL / OCCUPATIONAL THERAPY - DAILY TREATMENT NOTE (updated )    Patient Name: Lourdes Decrestephen    Date: 2023    : 1945  Insurance: Payor: Jose Marie / Plan: [employer] PLUS HMO / Product Type: *No Product type* /      Patient  verified yes     Visit #   Current / Total 3 16   Time   In / Out 2:18 3:07   Pain   In / Out 3/10 1   Subjective Functional Status/Changes: Reports she still has that pain in the L side of the neck and shoulder. TREATMENT AREA =  Cervicalgia [M54.2]    OBJECTIVE  Modalities Rationale:     decrease pain to improve patient's ability to progress to PLOF and address remaining functional goals. min [] Estim Unattended, type/location:                                      []  w/ice    []  w/heat    min [] Estim Attended, type/location:                                     []  w/US     []  w/ice    []  w/heat    []  TENS insruct      min []  Mechanical Traction: type/lbs                   []  pro   []  sup   []  int   []  cont    []  before manual    []  after manual    min []  Ultrasound, settings/location:      min []  Iontophoresis w/ dexamethasone, location:                                               []  take home patch       []  in clinic   10 min  unbill []  Ice     [x]  Heat    location/position: Supine on c/s    min []  Paraffin,  details:     min []  Vasopneumatic Device, press/temp:     min []  Sharion Good / Krista Laws: If using vaso (only need to measure limb vaso being performed on)      pre-treatment girth :       post-treatment girth :       measured at (landmark location) :      min []  Other:    Skin assessment post-treatment (if applicable):    [x]  intact    []  redness- no adverse reaction                 []redness - adverse reaction:        Therapeutic Procedures:   Tx Min Billable or 1:1 Min (if diff from Tx Min) Procedure, Rationale, Specifics   14  91132 Therapeutic Exercise (timed):  increase ROM, strength, coordination, balance, and

## 2023-08-07 ENCOUNTER — HOSPITAL ENCOUNTER (OUTPATIENT)
Facility: HOSPITAL | Age: 78
Setting detail: RECURRING SERIES
Discharge: HOME | End: 2023-08-10
Payer: MEDICARE

## 2023-08-07 PROCEDURE — 20560 NDL INSJ W/O NJX 1 OR 2 MUSC: CPT

## 2023-08-07 PROCEDURE — 97140 MANUAL THERAPY 1/> REGIONS: CPT

## 2023-08-07 PROCEDURE — 97110 THERAPEUTIC EXERCISES: CPT

## 2023-08-09 ENCOUNTER — HOSPITAL ENCOUNTER (OUTPATIENT)
Facility: HOSPITAL | Age: 78
Setting detail: RECURRING SERIES
Discharge: HOME | End: 2023-08-12
Payer: MEDICARE

## 2023-08-09 PROCEDURE — 97140 MANUAL THERAPY 1/> REGIONS: CPT

## 2023-08-09 PROCEDURE — 97110 THERAPEUTIC EXERCISES: CPT

## 2023-08-09 NOTE — PROGRESS NOTES
PHYSICAL / OCCUPATIONAL THERAPY - DAILY TREATMENT NOTE (updated )    Patient Name: José Miguel Weber    Date: 2023    : 1945  Insurance: Payor: Dolores Mcdaniel / Plan: Jckacey Chica [employer] HMO / Product Type: *No Product type* /      Patient  verified yes     Visit #   Current / Total 5 16   Time   In / Out 2:22 3:08   Pain   In / Out 1/10 0   Subjective Functional Status/Changes: Reports no pain yesterday and only had pain this morning form looking down at her husbands phone. Still  was a shock like pain. TREATMENT AREA =  Cervicalgia [M54.2]    OBJECTIVE  Modalities Rationale:     decrease pain to improve patient's ability to progress to PLOF and address remaining functional goals. min [] Estim Unattended, type/location:                                      []  w/ice    []  w/heat    min [] Estim Attended, type/location:                                     []  w/US     []  w/ice    []  w/heat    []  TENS insruct      min []  Mechanical Traction: type/lbs                   []  pro   []  sup   []  int   []  cont    []  before manual    []  after manual    min []  Ultrasound, settings/location:      min []  Iontophoresis w/ dexamethasone, location:                                               []  take home patch       []  in clinic   PD min  unbill []  Ice     [x]  Heat    location/position: Supine on c/s    min []  Paraffin,  details:     min []  Vasopneumatic Device, press/temp:     min []  Velvet Cross / Donnamae Granite: If using vaso (only need to measure limb vaso being performed on)      pre-treatment girth :       post-treatment girth :       measured at (landmark location) :      min []  Other:    Skin assessment post-treatment (if applicable):    [x]  intact    []  redness- no adverse reaction                 []redness - adverse reaction:        Therapeutic Procedures:   Tx Min Billable or 1:1 Min (if diff from Tx Min) Procedure, Rationale, Specifics   21  30642 Therapeutic Exercise (timed):

## 2023-08-09 NOTE — THERAPY RECERTIFICATION
[address] [facility], [address] [place] [address] [facility] - Phone: [phone]  Fax: [phone]  [facility] [address]/[facility] PHYSICAL THERAPY          Patient Name: Garrett Mcconnell : 1945   Treatment/Medical Diagnosis: Cervicalgia [M54.2]   Onset Date: chronic    Referral Source: Thai Stanford MD Start of Care [facility]): 7/10/23   Prior Hospitalization: See Medical History Provider #: [id number]   Prior Level of Function: Neck pain on and off with prolonged positions   Comorbidities: Arthritis, diabetes, HTN   Medications: Verified on Patient Summary List   Visits from [facility]: 5 Missed Visits: 0        Goal/Measure of Progress Goal Met? 1. Independent with HEP to progress to meet goals. Status at last Eval:  Current Status: yes yes   2. Pt to report decreased max pain levels less than 5/10 for improvement in ADLs. Status at last Eval: 810 Current Status: 4/10 yes   3. Improve FOTO score to 65/100 to show a significant functional change. Status at last Eval: 61 Current Status: 63 progressing             Key Functional Changes/Progress: Pt has made good progress in PT as demonstrated by decrased max pain leves at 4/10 and average pain level of 2-3/10. Reports no radicular symptoms anymore. DN performed last visit significantly helped pain with pt reporting no pain yesterday per pt report. We will continue with DN PRN. Pt reports her pain is no longer constant and only bothers her with neck protraction and small twinge with rotation. Says she still feels a lot of tightness, however better, along base of shoulders, but it gets better with movement. Pt report 75% improvement since beginning skilled PT.    Cervical ROM (at IE)  Flexion 53 (42)  Extension 24 \"it just stops\" (22 with tension in the back of the head)  SB R 30 L 28 (R 30 L 5 with \"a block\")  Rot R 64 L 53 (R 46 L 30 with stiffness)     Problem List: pain affecting

## 2023-08-14 ENCOUNTER — HOSPITAL ENCOUNTER (OUTPATIENT)
Facility: HOSPITAL | Age: 78
Setting detail: RECURRING SERIES
End: 2023-08-14
Payer: MEDICARE

## 2023-08-16 ENCOUNTER — APPOINTMENT (OUTPATIENT)
Facility: HOSPITAL | Age: 78
End: 2023-08-16
Payer: MEDICARE

## 2023-08-21 ENCOUNTER — HOSPITAL ENCOUNTER (OUTPATIENT)
Facility: HOSPITAL | Age: 78
Setting detail: RECURRING SERIES
Discharge: HOME | End: 2023-08-24
Payer: MEDICARE

## 2023-08-21 PROCEDURE — 97110 THERAPEUTIC EXERCISES: CPT

## 2023-08-21 PROCEDURE — 97140 MANUAL THERAPY 1/> REGIONS: CPT

## 2023-08-21 NOTE — PROGRESS NOTES
PHYSICAL / OCCUPATIONAL THERAPY - DAILY TREATMENT NOTE (updated )    Patient Name: Rebekah Mccracken    Date: 2023    : 1945  Insurance: Payor: Ganesh Velazquez / Plan: Diana Calabrese PLUS HMO / Product Type: *No Product type* /      Patient  verified yes     Visit #   Current / Total 6 16   Time   In / Out 3:02 3:40   Pain   In / Out 0/10 0   Subjective Functional Status/Changes: Notes the neck has been bothering her the past week or so but today is the first day it hasn't hurt. TREATMENT AREA =  Cervicalgia [M54.2]    OBJECTIVE  Modalities Rationale:     decrease pain to improve patient's ability to progress to PLOF and address remaining functional goals. min [] Estim Unattended, type/location:                                      []  w/ice    []  w/heat    min [] Estim Attended, type/location:                                     []  w/US     []  w/ice    []  w/heat    []  TENS insruct      min []  Mechanical Traction: type/lbs                   []  pro   []  sup   []  int   []  cont    []  before manual    []  after manual    min []  Ultrasound, settings/location:      min []  Iontophoresis w/ dexamethasone, location:                                               []  take home patch       []  in clinic   PD min  unbill []  Ice     [x]  Heat    location/position: Supine on c/s    min []  Paraffin,  details:     min []  Vasopneumatic Device, press/temp:     min []  Yamila De La O / Rona Hay: If using vaso (only need to measure limb vaso being performed on)      pre-treatment girth :       post-treatment girth :       measured at (landmark location) :      min []  Other:    Skin assessment post-treatment (if applicable):    [x]  intact    []  redness- no adverse reaction                 []redness - adverse reaction:        Therapeutic Procedures:   Tx Min Billable or 1:1 Min (if diff from Tx Min) Procedure, Rationale, Specifics   15  47408 Therapeutic Exercise (timed):  increase ROM, strength,

## 2023-08-23 ENCOUNTER — HOSPITAL ENCOUNTER (OUTPATIENT)
Facility: HOSPITAL | Age: 78
Setting detail: RECURRING SERIES
Discharge: HOME | End: 2023-08-26
Payer: MEDICARE

## 2023-08-23 PROCEDURE — 20560 NDL INSJ W/O NJX 1 OR 2 MUSC: CPT

## 2023-08-23 PROCEDURE — 97140 MANUAL THERAPY 1/> REGIONS: CPT

## 2023-08-23 PROCEDURE — 97110 THERAPEUTIC EXERCISES: CPT

## 2023-08-23 NOTE — PROGRESS NOTES
Patient will continue to benefit from skilled PT / OT services to modify and progress therapeutic interventions, analyze and address functional mobility deficits, analyze and address ROM deficits, analyze and address strength deficits, analyze and address soft tissue restrictions, analyze and cue for proper movement patterns, analyze and modify for postural abnormalities, and instruct in home and community integration to address functional deficits and attain remaining goals.     Progress toward goals / Updated goals:  []  See PN    No pain upon leaving    PLAN  yes Continue plan of care  [x]  Upgrade activities as tolerated  []  Discharge due to :  []  Other:    William Martinez PT    8/23/2023    8:13 AM    Future Appointments   Date Time Provider [id number]   8/23/2023  2:20 PM William Martinez PT [facility]   8/28/2023  3:00 PM William Martinez PT [facility]   8/30/2023  2:20 PM William Martinez PT [facility]   9/6/2023  1:00 PM William Martinez PT [facility]

## 2023-08-28 ENCOUNTER — HOSPITAL ENCOUNTER (OUTPATIENT)
Facility: HOSPITAL | Age: 78
Setting detail: RECURRING SERIES
End: 2023-08-28
Payer: MEDICARE

## 2023-08-30 ENCOUNTER — HOSPITAL ENCOUNTER (OUTPATIENT)
Facility: HOSPITAL | Age: 78
Setting detail: RECURRING SERIES
Discharge: HOME | End: 2023-09-02
Payer: MEDICARE

## 2023-08-30 PROCEDURE — 97110 THERAPEUTIC EXERCISES: CPT

## 2023-08-30 PROCEDURE — 97535 SELF CARE MNGMENT TRAINING: CPT

## 2023-08-30 NOTE — PROGRESS NOTES
PHYSICAL / OCCUPATIONAL THERAPY - DAILY TREATMENT NOTE (updated )    Patient Name: Roger Muro    Date: 2023    : 1945  Insurance: Payor: Jayla Guerra / Plan: Maribel Beers PLUS HMO / Product Type: *No Product type* /      Patient  verified yes     Visit #   Current / Total 8 16   Time   In / Out 2:23 2:46   Pain   In / Out 0/10 0   Subjective Functional Status/Changes: Notes she was on her feet yesterday from 8am - 10pm and had no pain in the neck. Was in pain for 2-3 days after last visit. TREATMENT AREA =  Cervicalgia [M54.2]    OBJECTIVE  Therapeutic Procedures: Tx Min Billable or 1:1 Min (if diff from Tx Min) Procedure, Rationale, Specifics   15  19722 Therapeutic Exercise (timed):  increase ROM, strength, coordination, balance, and proprioception to improve patient's ability to progress to PLOF and address remaining functional goals. (see flow sheet as applicable)     Details if applicable:     85819 Needle Insertion w/o Injection (1 or 2 muscles) (un-timed): deactivate trigger points, improve muscle spasms, eliminate muscle imbalances, and decrease myofascial pain to improve patient's ability to progress to PLOF and address remaining functional goals. Details if applicable:     -  58623 Manual Therapy (timed):  decrease pain, increase ROM, increase tissue extensibility, and decrease trigger points to improve patient's ability to progress to PLOF and address remaining functional goals. The manual therapy interventions were performed at a separate and distinct time from the therapeutic activities interventions .  (see flow sheet as applicable)     Details if applicable:  STM along bilat UT and levator, SOR, cervical distraction   8  30673 Self Care/Home Management (timed):  improve patient knowledge and understanding of pain reducing techniques, positioning, posture/ergonomics, home safety, activity modification, diagnosis/prognosis, and physical therapy expectations,

## 2023-08-30 NOTE — THERAPY RECERTIFICATION
[address] [facility], [address] [place] [address] [facility] - Phone: [phone]  Fax: [phone]  [facility] [address]/[facility] PHYSICAL THERAPY          Patient Name: Jennifer Luna : 1945   Treatment/Medical Diagnosis: Cervicalgia [M54.2]   Onset Date: chronic    Referral Source: Anila Almaguer MD Start of Care [facility]): 7/10/23   Prior Hospitalization: See Medical History Provider #: [id number]   Prior Level of Function: Neck pain on and off with prolonged positions   Comorbidities: Arthritis, diabetes, HTN   Medications: Verified on Patient Summary List   Visits from [facility]: 8 Missed Visits: 0        Goal/Measure of Progress Goal Met? 1.  Pt to report sitting in car for 1 hour without onset of neck pain to improve community integration. Status at last Eval: Goal established Current Status: Occasionally bothersome Progressing   2. Pt to report decreased max pain levels less than 3/10 for improvement in QoL. Status at last Eval: 4/10 Current Status: 4-5/10 Progressing   3. Improve FOTO score to 65/100 to show a significant functional change. Status at last Eval: 61 Current Status: 63 progressing             Key Functional Changes/Progress: Pt has made good progress in PT as demonstrated by decrased max pain leves at 4-5/10 and average pain level of 2-3/10. Reports no radicular symptoms anymore. Notes significant improvement in ROM. The past 2 visits, pt has arrived without pain for past 2-3 days but leaves with ache and then increase in pain for 2-3 days. Today, pt reports being on her feet all day yesterday with zero pain. We decided to put pt on hold at this time as she is no longer tolerating MT and is compliant and I with HEP. ROM below from last PN on 23.   Cervical ROM (at IE)  Flexion 53 (42)  Extension 24 \"it just stops\" (22 with tension in the back of the head)  SB R 30 L 28 (R 30 L 5 with \"a block\")  Rot R 64